# Patient Record
Sex: MALE | Race: BLACK OR AFRICAN AMERICAN
[De-identification: names, ages, dates, MRNs, and addresses within clinical notes are randomized per-mention and may not be internally consistent; named-entity substitution may affect disease eponyms.]

---

## 2018-08-10 ENCOUNTER — HOSPITAL ENCOUNTER (EMERGENCY)
Dept: HOSPITAL 47 - EC | Age: 53
Discharge: LEFT BEFORE DISCHARGE | End: 2018-08-10
Payer: COMMERCIAL

## 2018-08-10 VITALS
TEMPERATURE: 98.6 F | HEART RATE: 89 BPM | DIASTOLIC BLOOD PRESSURE: 89 MMHG | RESPIRATION RATE: 18 BRPM | SYSTOLIC BLOOD PRESSURE: 132 MMHG

## 2018-08-10 DIAGNOSIS — Z79.4: ICD-10-CM

## 2018-08-10 DIAGNOSIS — Z79.899: ICD-10-CM

## 2018-08-10 DIAGNOSIS — I25.10: ICD-10-CM

## 2018-08-10 DIAGNOSIS — Z87.891: ICD-10-CM

## 2018-08-10 DIAGNOSIS — Z95.818: ICD-10-CM

## 2018-08-10 DIAGNOSIS — H53.9: Primary | ICD-10-CM

## 2018-08-10 DIAGNOSIS — Z86.14: ICD-10-CM

## 2018-08-10 DIAGNOSIS — I50.9: ICD-10-CM

## 2018-08-10 DIAGNOSIS — E11.9: ICD-10-CM

## 2018-08-10 DIAGNOSIS — I11.0: ICD-10-CM

## 2018-08-10 LAB — GLUCOSE BLD-MCNC: 90 MG/DL (ref 75–99)

## 2018-08-10 PROCEDURE — 99284 EMERGENCY DEPT VISIT MOD MDM: CPT

## 2018-08-10 PROCEDURE — 36415 COLL VENOUS BLD VENIPUNCTURE: CPT

## 2018-08-10 NOTE — ED
Eye Problem HPI





- General


Chief complaint: Eye Problems


Stated complaint: vision trouble


Time Seen by Provider: 08/10/18 14:32


Source: patient, RN notes reviewed, old records reviewed


Mode of arrival: ambulatory


Limitations: no limitations





- History of Present Illness


Initial comments: 





Patient is a 32-year-old male with history of diabetes presents emergency 

Department with irritation and changes in vision his left eye.  He reports he's 

been having symptoms intermittently for the past 3 months.  Patient reports 

that he is concerned that his blood sugar was elevated and that's why his 

vision is having abnormal changes.  Patient states that he has had no metformin 

within the past few weeks as his ran out of his medication.  [facility]  [facility] within the next few weeks for his eyes.





- Related Data


 Home Medications











 Medication  Instructions  Recorded  Confirmed


 


Atorvastatin [Lipitor] 80 mg PO DAILY 16


 


Carvedilol [Coreg] 12.5 mg PO BID 16


 


INSULIN LISPRO (humaLOG) [HumaLOG] 16 units SQ ACHS 16








 Previous Rx's











 Medication  Instructions  Recorded


 


Aspirin EC [Ecotrin] 325 mg PO DAILY  tablet. 03/13/15


 


Atorvastatin [Lipitor] 40 mg PO HS #30 tab 16


 


Carvedilol [Coreg] 6.25 mg PO BID-W/MEALS #60 tab 16


 


Furosemide [Lasix] 40 mg PO DAILY #30 tab 16


 


Isosorbide Mononitrate ER [Imdur] 60 mg PO DAILY #30 tab.er.24h 16


 


Lisinopril [Zestril] 10 mg PO DAILY #30 tab 16


 


Nitroglycerin Sl Tabs [Nitrostat] 0.4 mg SUBLINGUAL Q5M PRN #30 tab 16


 


Spironolactone [Aldactone] 25 mg PO DAILY #30 tab 16











 Allergies











Allergy/AdvReac Type Severity Reaction Status Date / Time


 


No Known Allergies Allergy   Verified 08/10/18 14:29














Review of Systems


ROS Statement: 


Those systems with pertinent positive or pertinent negative responses have been 

documented in the HPI.





ROS Other: All systems not noted in ROS Statement are negative.





Past Medical History


Past Medical History: Coronary Artery Disease (CAD), Chest Pain / Angina, Heart 

Failure, Diabetes Mellitus, Hypertension


Additional Past Medical History / Comment(s): 12 Pt Presented by 

ambulance to [facility] ER with chest pain that felt like "Heart burn".  On - Pt was admitted with chest pain- had cardiac cath which showed mild disease 

in his LAD, occluded cx, Occluded proximal RCA.  Other HX:  sciatica, ADMITTED 

ON 3/10/15 WITH ELEVATED BLOOD GLUCOSE OF 1200, PATIENT NOT AWARE OF PREVIOUS 

DIABETES.


History of Any Multi-Drug Resistant Organisms: MRSA


Date of last positivie culture/infection: 


MDRO Source:: left index finger


Past Surgical History: Heart Catheterization


Additional Past Surgical History / Comment(s): - Pt admitted with 

chest pain and had a cardiac cath which showed mild disease in his LAD, 

occluded cx, occluded proximal RCA.  Stitches in right arm from deep laceration 

long ago.


Past Anesthesia/Blood Transfusion Reactions: No Reported Reaction


Additional Past Anesthesia/Blood Transfusion Reaction / Comment(s): Never had 

past transfusion


Past Psychological History: No Psychological Hx Reported, PTSD


Smoking Status: Former smoker


Past Alcohol Use History: None Reported


Past Drug Use History: None Reported





- Past Family History


  ** Father Brother(s)


Family Medical History: Coronary Artery Disease (CAD), Myocardial Infarction (MI

)


Additional Family Medical History / Comment(s): Pt's father and brothers have 

all  of MI's.





General Exam





- General Exam Comments


Initial Comments: 





52-year-old male.  Alert and oriented.  Patient is -American.  No acute 

distress.


Limitations: no limitations


General appearance: alert, in no apparent distress


Head exam: Present: atraumatic, normocephalic, normal inspection


Eye exam: Present: normal appearance, PERRL, EOMI.  Absent: scleral icterus, 

conjunctival injection, periorbital swelling


ENT exam: Present: normal exam, mucous membranes moist


Neck exam: Present: normal inspection


Respiratory exam: Present: normal lung sounds bilaterally.  Absent: respiratory 

distress, wheezes, rales, rhonchi, stridor


Cardiovascular Exam: Present: regular rate, normal rhythm, normal heart sounds.

  Absent: systolic murmur, diastolic murmur, rubs, gallop, clicks


Neurological exam: Present: alert, oriented X3


Psychiatric exam: Present: normal affect, normal mood


Skin exam: Present: warm, dry, intact, normal color.  Absent: rash





Course





 Vital Signs











  08/10/18





  14:26


 


Temperature 98.6 F


 


Pulse Rate 89


 


Respiratory 18





Rate 


 


Blood Pressure 132/89


 


O2 Sat by Pulse 100





Oximetry 














Medical Decision Making





- Medical Decision Making





32-year-old male presents emergency Department with complaints of visual 

changes with the left eye.  He reports that they come and go for the past few 

weeks.  He is mainly concerned for his blood sugar being elevated.  He states 

that he would like to have his blood sugar checked.  I did check it and it was 

90 at this time.  He shouldn't was a firmness with a normal blood result.  I 

discussed the could do further evaluation for his eye and further testing.  

Patient was quite adamant he would like to leave.  He stated that he had to go 

to another appointment.  Patient will be leaving, he did not sign out AMA 

papers.





- Lab Data





 Lab Results











  08/10/18 Range/Units





  15:04 


 


POC Glucose (mg/dL)  90  (75-99)  mg/dL


 


POC Glu Operater ID  Elizabeth Precaido  














Disposition


Clinical Impression: 


 Normal blood sugar, Visual changes





Disposition: Left Against Medical Advice


Condition: Stable


Is patient prescribed a controlled substance at d/c from ED?: No


Referrals: 


Nonstaff,Physician [Primary Care Provider] - 1-2 days


Time of Disposition: 15:20

## 2021-02-02 ENCOUNTER — HOSPITAL ENCOUNTER (INPATIENT)
Dept: HOSPITAL 47 - EC | Age: 56
LOS: 7 days | Discharge: HOME | DRG: 871 | End: 2021-02-09
Attending: INTERNAL MEDICINE | Admitting: INTERNAL MEDICINE
Payer: COMMERCIAL

## 2021-02-02 VITALS — BODY MASS INDEX: 35.4 KG/M2

## 2021-02-02 DIAGNOSIS — Z79.899: ICD-10-CM

## 2021-02-02 DIAGNOSIS — A40.3: Primary | ICD-10-CM

## 2021-02-02 DIAGNOSIS — I82.612: ICD-10-CM

## 2021-02-02 DIAGNOSIS — R04.2: ICD-10-CM

## 2021-02-02 DIAGNOSIS — J13: ICD-10-CM

## 2021-02-02 DIAGNOSIS — N17.9: ICD-10-CM

## 2021-02-02 DIAGNOSIS — F17.210: ICD-10-CM

## 2021-02-02 DIAGNOSIS — F43.10: ICD-10-CM

## 2021-02-02 DIAGNOSIS — I25.2: ICD-10-CM

## 2021-02-02 DIAGNOSIS — I50.22: ICD-10-CM

## 2021-02-02 DIAGNOSIS — I25.10: ICD-10-CM

## 2021-02-02 DIAGNOSIS — Z86.14: ICD-10-CM

## 2021-02-02 DIAGNOSIS — Z82.49: ICD-10-CM

## 2021-02-02 DIAGNOSIS — Z91.14: ICD-10-CM

## 2021-02-02 DIAGNOSIS — E78.5: ICD-10-CM

## 2021-02-02 DIAGNOSIS — Z20.822: ICD-10-CM

## 2021-02-02 DIAGNOSIS — G58.9: ICD-10-CM

## 2021-02-02 DIAGNOSIS — Z96.642: ICD-10-CM

## 2021-02-02 DIAGNOSIS — E11.10: ICD-10-CM

## 2021-02-02 DIAGNOSIS — Z79.82: ICD-10-CM

## 2021-02-02 DIAGNOSIS — H57.89: ICD-10-CM

## 2021-02-02 DIAGNOSIS — I21.4: ICD-10-CM

## 2021-02-02 DIAGNOSIS — E11.41: ICD-10-CM

## 2021-02-02 DIAGNOSIS — I25.5: ICD-10-CM

## 2021-02-02 DIAGNOSIS — J44.0: ICD-10-CM

## 2021-02-02 DIAGNOSIS — J98.11: ICD-10-CM

## 2021-02-02 DIAGNOSIS — I11.0: ICD-10-CM

## 2021-02-02 DIAGNOSIS — Z91.19: ICD-10-CM

## 2021-02-02 DIAGNOSIS — Z79.4: ICD-10-CM

## 2021-02-02 DIAGNOSIS — I47.1: ICD-10-CM

## 2021-02-02 LAB
ALBUMIN SERPL-MCNC: 4.4 G/DL (ref 3.5–5)
ALP SERPL-CCNC: 121 U/L (ref 38–126)
ALT SERPL-CCNC: 29 U/L (ref 4–49)
ANION GAP SERPL CALC-SCNC: 10 MMOL/L
ANION GAP SERPL CALC-SCNC: 16 MMOL/L
ANION GAP SERPL CALC-SCNC: 19 MMOL/L
APTT BLD: 19.6 SEC (ref 22–30)
AST SERPL-CCNC: 43 U/L (ref 17–59)
BASOPHILS # BLD AUTO: 0 K/UL (ref 0–0.2)
BASOPHILS NFR BLD AUTO: 0 %
BUN SERPL-SCNC: 30 MG/DL (ref 9–20)
BUN SERPL-SCNC: 30 MG/DL (ref 9–20)
BUN SERPL-SCNC: 32 MG/DL (ref 9–20)
CALCIUM SPEC-MCNC: 9.9 MG/DL (ref 8.4–10.2)
CHLORIDE SERPL-SCNC: 101 MMOL/L (ref 98–107)
CHLORIDE SERPL-SCNC: 105 MMOL/L (ref 98–107)
CHLORIDE SERPL-SCNC: 98 MMOL/L (ref 98–107)
CO2 SERPL-SCNC: 14 MMOL/L (ref 22–30)
CO2 SERPL-SCNC: 22 MMOL/L (ref 22–30)
CO2 SERPL-SCNC: 24 MMOL/L (ref 22–30)
D DIMER PPP FEU-MCNC: 1.06 MG/L FEU (ref ?–0.6)
EOSINOPHIL # BLD AUTO: 0 K/UL (ref 0–0.7)
EOSINOPHIL NFR BLD AUTO: 0 %
ERYTHROCYTE [DISTWIDTH] IN BLOOD BY AUTOMATED COUNT: 5.01 M/UL (ref 4.3–5.9)
ERYTHROCYTE [DISTWIDTH] IN BLOOD: 13.8 % (ref 11.5–15.5)
GLUCOSE BLD-MCNC: 107 MG/DL (ref 75–99)
GLUCOSE BLD-MCNC: 115 MG/DL (ref 75–99)
GLUCOSE BLD-MCNC: 126 MG/DL (ref 75–99)
GLUCOSE BLD-MCNC: 127 MG/DL (ref 75–99)
GLUCOSE BLD-MCNC: 171 MG/DL (ref 75–99)
GLUCOSE BLD-MCNC: 468 MG/DL (ref 75–99)
GLUCOSE BLD-MCNC: 470 MG/DL (ref 75–99)
GLUCOSE BLD-MCNC: 93 MG/DL (ref 75–99)
GLUCOSE SERPL-MCNC: 266 MG/DL (ref 74–99)
GLUCOSE SERPL-MCNC: 579 MG/DL (ref 74–99)
GLUCOSE SERPL-MCNC: 87 MG/DL (ref 74–99)
GLUCOSE UR QL: (no result)
HCT VFR BLD AUTO: 42.7 % (ref 39–53)
HGB BLD-MCNC: 14.4 GM/DL (ref 13–17.5)
INR PPP: 1 (ref ?–1.2)
KETONES UR QL STRIP.AUTO: (no result)
LYMPHOCYTES # SPEC AUTO: 2.8 K/UL (ref 1–4.8)
LYMPHOCYTES NFR SPEC AUTO: 16 %
MAGNESIUM SPEC-SCNC: 2.3 MG/DL (ref 1.6–2.3)
MCH RBC QN AUTO: 28.7 PG (ref 25–35)
MCHC RBC AUTO-ENTMCNC: 33.7 G/DL (ref 31–37)
MCV RBC AUTO: 85.1 FL (ref 80–100)
MONOCYTES # BLD AUTO: 0.8 K/UL (ref 0–1)
MONOCYTES NFR BLD AUTO: 5 %
NEUTROPHILS # BLD AUTO: 13.5 K/UL (ref 1.3–7.7)
NEUTROPHILS NFR BLD AUTO: 78 %
PH UR: 5 [PH] (ref 5–8)
PLATELET # BLD AUTO: 181 K/UL (ref 150–450)
POTASSIUM SERPL-SCNC: 3.9 MMOL/L (ref 3.5–5.1)
POTASSIUM SERPL-SCNC: 4 MMOL/L (ref 3.5–5.1)
POTASSIUM SERPL-SCNC: 4.9 MMOL/L (ref 3.5–5.1)
PROT SERPL-MCNC: 7.7 G/DL (ref 6.3–8.2)
PT BLD: 10.5 SEC (ref 9–12)
SODIUM SERPL-SCNC: 131 MMOL/L (ref 137–145)
SODIUM SERPL-SCNC: 139 MMOL/L (ref 137–145)
SODIUM SERPL-SCNC: 139 MMOL/L (ref 137–145)
SP GR UR: 1.03 (ref 1–1.03)
UROBILINOGEN UR QL STRIP: <2 MG/DL (ref ?–2)
WBC # BLD AUTO: 17.4 K/UL (ref 3.8–10.6)
WBC # UR AUTO: 1 /HPF (ref 0–5)

## 2021-02-02 PROCEDURE — 87040 BLOOD CULTURE FOR BACTERIA: CPT

## 2021-02-02 PROCEDURE — 83735 ASSAY OF MAGNESIUM: CPT

## 2021-02-02 PROCEDURE — 93005 ELECTROCARDIOGRAM TRACING: CPT

## 2021-02-02 PROCEDURE — 74177 CT ABD & PELVIS W/CONTRAST: CPT

## 2021-02-02 PROCEDURE — 93312 ECHO TRANSESOPHAGEAL: CPT

## 2021-02-02 PROCEDURE — 84100 ASSAY OF PHOSPHORUS: CPT

## 2021-02-02 PROCEDURE — 83880 ASSAY OF NATRIURETIC PEPTIDE: CPT

## 2021-02-02 PROCEDURE — 96368 THER/DIAG CONCURRENT INF: CPT

## 2021-02-02 PROCEDURE — 85730 THROMBOPLASTIN TIME PARTIAL: CPT

## 2021-02-02 PROCEDURE — 87205 SMEAR GRAM STAIN: CPT

## 2021-02-02 PROCEDURE — 85025 COMPLETE CBC W/AUTO DIFF WBC: CPT

## 2021-02-02 PROCEDURE — 86140 C-REACTIVE PROTEIN: CPT

## 2021-02-02 PROCEDURE — 93325 DOPPLER ECHO COLOR FLOW MAPG: CPT

## 2021-02-02 PROCEDURE — 84145 PROCALCITONIN (PCT): CPT

## 2021-02-02 PROCEDURE — 84484 ASSAY OF TROPONIN QUANT: CPT

## 2021-02-02 PROCEDURE — 85027 COMPLETE CBC AUTOMATED: CPT

## 2021-02-02 PROCEDURE — 71046 X-RAY EXAM CHEST 2 VIEWS: CPT

## 2021-02-02 PROCEDURE — 96365 THER/PROPH/DIAG IV INF INIT: CPT

## 2021-02-02 PROCEDURE — 80061 LIPID PANEL: CPT

## 2021-02-02 PROCEDURE — 82565 ASSAY OF CREATININE: CPT

## 2021-02-02 PROCEDURE — 36415 COLL VENOUS BLD VENIPUNCTURE: CPT

## 2021-02-02 PROCEDURE — 84520 ASSAY OF UREA NITROGEN: CPT

## 2021-02-02 PROCEDURE — 82947 ASSAY GLUCOSE BLOOD QUANT: CPT

## 2021-02-02 PROCEDURE — 96376 TX/PRO/DX INJ SAME DRUG ADON: CPT

## 2021-02-02 PROCEDURE — 83605 ASSAY OF LACTIC ACID: CPT

## 2021-02-02 PROCEDURE — 81001 URINALYSIS AUTO W/SCOPE: CPT

## 2021-02-02 PROCEDURE — 80053 COMPREHEN METABOLIC PANEL: CPT

## 2021-02-02 PROCEDURE — 87070 CULTURE OTHR SPECIMN AEROBIC: CPT

## 2021-02-02 PROCEDURE — 80048 BASIC METABOLIC PNL TOTAL CA: CPT

## 2021-02-02 PROCEDURE — 96361 HYDRATE IV INFUSION ADD-ON: CPT

## 2021-02-02 PROCEDURE — 87635 SARS-COV-2 COVID-19 AMP PRB: CPT

## 2021-02-02 PROCEDURE — 85379 FIBRIN DEGRADATION QUANT: CPT

## 2021-02-02 PROCEDURE — 85049 AUTOMATED PLATELET COUNT: CPT

## 2021-02-02 PROCEDURE — 93306 TTE W/DOPPLER COMPLETE: CPT

## 2021-02-02 PROCEDURE — 85610 PROTHROMBIN TIME: CPT

## 2021-02-02 PROCEDURE — 71275 CT ANGIOGRAPHY CHEST: CPT

## 2021-02-02 PROCEDURE — 71045 X-RAY EXAM CHEST 1 VIEW: CPT

## 2021-02-02 PROCEDURE — 99291 CRITICAL CARE FIRST HOUR: CPT

## 2021-02-02 PROCEDURE — 80051 ELECTROLYTE PANEL: CPT

## 2021-02-02 PROCEDURE — 83036 HEMOGLOBIN GLYCOSYLATED A1C: CPT

## 2021-02-02 PROCEDURE — 93458 L HRT ARTERY/VENTRICLE ANGIO: CPT

## 2021-02-02 RX ADMIN — INSULIN ASPART SCH UNIT: 100 INJECTION, SOLUTION INTRAVENOUS; SUBCUTANEOUS at 11:23

## 2021-02-02 RX ADMIN — PANTOPRAZOLE SODIUM SCH MG: 40 INJECTION, POWDER, FOR SOLUTION INTRAVENOUS at 17:27

## 2021-02-02 RX ADMIN — NITROGLYCERIN SCH: 20 OINTMENT TOPICAL at 23:25

## 2021-02-02 RX ADMIN — CEFAZOLIN SCH MLS/HR: 330 INJECTION, POWDER, FOR SOLUTION INTRAMUSCULAR; INTRAVENOUS at 14:56

## 2021-02-02 RX ADMIN — NITROGLYCERIN SCH INCH: 20 OINTMENT TOPICAL at 23:23

## 2021-02-02 RX ADMIN — NITROGLYCERIN SCH: 20 OINTMENT TOPICAL at 16:23

## 2021-02-02 RX ADMIN — NITROGLYCERIN SCH: 20 OINTMENT TOPICAL at 17:27

## 2021-02-02 RX ADMIN — DEXTROSE MONOHYDRATE, SODIUM CHLORIDE, AND POTASSIUM CHLORIDE SCH MLS/HR: 50; 4.5; 1.49 INJECTION, SOLUTION INTRAVENOUS at 17:27

## 2021-02-02 RX ADMIN — DEXTROSE MONOHYDRATE, SODIUM CHLORIDE, AND POTASSIUM CHLORIDE SCH: 50; 4.5; 1.49 INJECTION, SOLUTION INTRAVENOUS at 21:18

## 2021-02-02 RX ADMIN — HEPARIN SODIUM SCH MLS/HR: 10000 INJECTION, SOLUTION INTRAVENOUS at 12:29

## 2021-02-02 RX ADMIN — INSULIN ASPART SCH: 100 INJECTION, SOLUTION INTRAVENOUS; SUBCUTANEOUS at 21:18

## 2021-02-02 RX ADMIN — INSULIN ASPART SCH UNIT: 100 INJECTION, SOLUTION INTRAVENOUS; SUBCUTANEOUS at 13:13

## 2021-02-02 RX ADMIN — CEFAZOLIN SCH: 330 INJECTION, POWDER, FOR SOLUTION INTRAMUSCULAR; INTRAVENOUS at 22:27

## 2021-02-02 RX ADMIN — CEFAZOLIN SCH MLS/HR: 330 INJECTION, POWDER, FOR SOLUTION INTRAMUSCULAR; INTRAVENOUS at 21:08

## 2021-02-02 NOTE — ED
General Adult HPI





- General


Chief complaint: Chest Pain


Stated complaint: Chest pain


Time Seen by Provider: 21 08:58


Source: patient, EMS, RN notes reviewed


Mode of arrival: EMS


Limitations: no limitations





- History of Present Illness


Initial comments: 





patient is a pleasant 55-year-old male presenting to the emergency Department 

with chest discomfort.  Patient had mild symptoms yesterday, worse this morning.

 Discomfort is now mild again at this time.  Discomfort is difficult to 

describe.  No radiation.  Patient does feel a little short of breath.  Patient 

did have cough starting yesterday that was somewhat severe.  Patient did have an

episode of hemoptysis.  Patient states chest discomfort does increase with deep 

breaths.  No nausea or diaphoresis.  Patient questions whether or not there 

could've been a fever.  No leg pain or leg swelling.





- Related Data


                                Home Medications











 Medication  Instructions  Recorded  Confirmed


 


Atorvastatin [Lipitor] 40 mg PO DAILY 21


 


Isosorbide Mononitrate ER [Imdur] 90 mg PO DAILY 21


 


amLODIPine [Norvasc] 2.5 mg PO DAILY 21


 


metFORMIN HCL [Glucophage] 500 mg PO BID 21








                                  Previous Rx's











 Medication  Instructions  Recorded


 


Furosemide [Lasix] 40 mg PO DAILY #30 tab 16


 


Nitroglycerin Sl Tabs [Nitrostat] 0.4 mg SUBLINGUAL Q5M PRN #30 tab 16


 


lisinopriL [Zestril] 10 mg PO DAILY #30 tab 16











                                    Allergies











Allergy/AdvReac Type Severity Reaction Status Date / Time


 


No Known Allergies Allergy   Verified 21 10:29














Review of Systems


ROS Statement: 


Those systems with pertinent positive or pertinent negative responses have been 

documented in the HPI.





ROS Other: All systems not noted in ROS Statement are negative.


Constitutional: Reports: as per HPI


Eyes: Denies: eye pain


ENT: Denies: ear pain


Respiratory: Reports: cough, dyspnea


Cardiovascular: Reports: chest pain


Endocrine: Denies: fatigue


Gastrointestinal: Denies: abdominal pain


Genitourinary: Reports: frequency.  Denies: urgency


Musculoskeletal: Denies: back pain


Skin: Denies: rash


Neurological: Denies: weakness





Past Medical History


Past Medical History: Coronary Artery Disease (CAD), Chest Pain / Angina, Heart 

Failure, Diabetes Mellitus, Hypertension


Additional Past Medical History / Comment(s): 12 Pt Presented by ambulance

 to [facility] ER with chest pain that felt like "Heart burn".  On - Pt was 

admitted with chest pain- had cardiac cath which showed mild disease in his LAD,

 occluded cx, Occluded proximal RCA.  Other HX:  sciatica, ADMITTED ON 3/10/15 

WITH ELEVATED BLOOD GLUCOSE OF 1200, PATIENT NOT AWARE OF PREVIOUS DIABETES.


History of Any Multi-Drug Resistant Organisms: MRSA


Date of last positivie culture/infection: 


MDRO Source:: left index finger


Past Surgical History: Heart Catheterization


Additional Past Surgical History / Comment(s): - Pt admitted with 

chest pain and had a cardiac cath which showed mild disease in his LAD, occluded

 cx, occluded proximal RCA.  Stitches in right arm from deep laceration long 

ago.


Past Anesthesia/Blood Transfusion Reactions: No Reported Reaction


Additional Past Anesthesia/Blood Transfusion Reaction / Comment(s): Never had 

past transfusion


Past Psychological History: No Psychological Hx Reported, PTSD


Smoking Status: Current some day smoker


Past Alcohol Use History: None Reported


Past Drug Use History: Marijuana





- Past Family History


  ** Father Brother(s)


Family Medical History: Coronary Artery Disease (CAD), Myocardial Infarction 

(MI)


Additional Family Medical History / Comment(s): Pt's father and brothers have 

all  of MI's.





General Exam


Limitations: no limitations


General appearance: alert, in no apparent distress


Head exam: Present: normocephalic


Eye exam: Present: normal appearance


ENT exam: Present: other (Poor dentition)


Neck exam: Present: normal inspection


Respiratory exam: Present: normal lung sounds bilaterally, chest wall tenderness

 (Mild tenderness upper left anterior chest wall)


Cardiovascular Exam: Present: regular rate, normal rhythm


  ** Expanded


Peripheral pulses: 2+: Radial (R), Radial (L), Posterior Tibialis (R), Posterior

 Tibialis (L)


GI/Abdominal exam: Present: soft.  Absent: tenderness


Extremities exam: Present: normal inspection.  Absent: pedal edema, calf 

tenderness


Neurological exam: Present: alert


Psychiatric exam: Present: normal affect, normal mood


Skin exam: Present: normal color





Course


                                   Vital Signs











  21





  08:54 09:01 09:12


 


Temperature 99.1 F  


 


Pulse Rate 98  96


 


Pulse Rate [  97 





Cardiac Monitor   





]   


 


Respiratory 16 15 16





Rate   


 


Blood Pressure 136/87  136/87


 


O2 Sat by Pulse 97  100





Oximetry   














  21





  09:30 11:00


 


Temperature  98.6 F


 


Pulse Rate 96 91


 


Pulse Rate [  





Cardiac Monitor  





]  


 


Respiratory 14 16





Rate  


 


Blood Pressure 136/87 144/95


 


O2 Sat by Pulse 97 100





Oximetry  














- Reevaluation(s)


Reevaluation #1: 





21 11:23


There is some potential for infection and sepsis diagnosed at 11:20 AM.  Blood 

culture and lactic acid and IV antibiotics will be ordered





EKG Findings





- EKG Comments:


EKG Findings:: Normal sinus rhythm and 96.  .  .  .  .

 Left axis.  LVH with repolarization change.





Medical Decision Making





- Medical Decision Making





Patient reevaluated and updated.  Patient will be heparinized for elevated 

troponin.  Corona test will be added.  Cardiology and pulmonary will be placed 

on consult.





- Lab Data


Result diagrams: 


                                 21 09:10





                                 21 09:10


                                   Lab Results











  21 Range/Units





  09:10 09:10 09:10 


 


WBC  17.4 H    (3.8-10.6)  k/uL


 


RBC  5.01    (4.30-5.90)  m/uL


 


Hgb  14.4    (13.0-17.5)  gm/dL


 


Hct  42.7    (39.0-53.0)  %


 


MCV  85.1    (80.0-100.0)  fL


 


MCH  28.7    (25.0-35.0)  pg


 


MCHC  33.7    (31.0-37.0)  g/dL


 


RDW  13.8    (11.5-15.5)  %


 


Plt Count  181    (150-450)  k/uL


 


MPV  9.2    


 


Neutrophils %  78    %


 


Lymphocytes %  16    %


 


Monocytes %  5    %


 


Eosinophils %  0    %


 


Basophils %  0    %


 


Neutrophils #  13.5 H    (1.3-7.7)  k/uL


 


Lymphocytes #  2.8    (1.0-4.8)  k/uL


 


Monocytes #  0.8    (0-1.0)  k/uL


 


Eosinophils #  0.0    (0-0.7)  k/uL


 


Basophils #  0.0    (0-0.2)  k/uL


 


PT   10.5   (9.0-12.0)  sec


 


INR   1.0   (<1.2)  


 


APTT   19.6 L   (22.0-30.0)  sec


 


D-Dimer   1.06 H   (<0.60)  mg/L FEU


 


Sodium    131 L  (137-145)  mmol/L


 


Potassium    4.9  (3.5-5.1)  mmol/L


 


Chloride    98  ()  mmol/L


 


Carbon Dioxide    14 L  (22-30)  mmol/L


 


Anion Gap    19  mmol/L


 


BUN    32 H  (9-20)  mg/dL


 


Creatinine    1.62 H  (0.66-1.25)  mg/dL


 


Est GFR (CKD-EPI)AfAm    54  (>60 ml/min/1.73 sqM)  


 


Est GFR (CKD-EPI)NonAf    46  (>60 ml/min/1.73 sqM)  


 


Glucose    579 H*  (74-99)  mg/dL


 


POC Glucose (mg/dL)     (75-99)  mg/dL


 


POC Glu Operater ID     


 


Calcium    9.9  (8.4-10.2)  mg/dL


 


Magnesium    2.3  (1.6-2.3)  mg/dL


 


Total Bilirubin    1.0  (0.2-1.3)  mg/dL


 


AST    43  (17-59)  U/L


 


ALT    29  (4-49)  U/L


 


Alkaline Phosphatase    121  ()  U/L


 


Troponin I     (0.000-0.034)  ng/mL


 


NT-Pro-B Natriuret Pep     pg/mL


 


Total Protein    7.7  (6.3-8.2)  g/dL


 


Albumin    4.4  (3.5-5.0)  g/dL


 


Urine Color     


 


Urine Appearance     (Clear)  


 


Urine pH     (5.0-8.0)  


 


Ur Specific Gravity     (1.001-1.035)  


 


Urine Protein     (Negative)  


 


Urine Glucose (UA)     (Negative)  


 


Urine Ketones     (Negative)  


 


Urine Blood     (Negative)  


 


Urine Nitrite     (Negative)  


 


Urine Bilirubin     (Negative)  


 


Urine Urobilinogen     (<2.0)  mg/dL


 


Ur Leukocyte Esterase     (Negative)  


 


Urine WBC     (0-5)  /hpf














  21 Range/Units





  09:10 09:10 09:10 


 


WBC     (3.8-10.6)  k/uL


 


RBC     (4.30-5.90)  m/uL


 


Hgb     (13.0-17.5)  gm/dL


 


Hct     (39.0-53.0)  %


 


MCV     (80.0-100.0)  fL


 


MCH     (25.0-35.0)  pg


 


MCHC     (31.0-37.0)  g/dL


 


RDW     (11.5-15.5)  %


 


Plt Count     (150-450)  k/uL


 


MPV     


 


Neutrophils %     %


 


Lymphocytes %     %


 


Monocytes %     %


 


Eosinophils %     %


 


Basophils %     %


 


Neutrophils #     (1.3-7.7)  k/uL


 


Lymphocytes #     (1.0-4.8)  k/uL


 


Monocytes #     (0-1.0)  k/uL


 


Eosinophils #     (0-0.7)  k/uL


 


Basophils #     (0-0.2)  k/uL


 


PT     (9.0-12.0)  sec


 


INR     (<1.2)  


 


APTT     (22.0-30.0)  sec


 


D-Dimer     (<0.60)  mg/L FEU


 


Sodium     (137-145)  mmol/L


 


Potassium     (3.5-5.1)  mmol/L


 


Chloride     ()  mmol/L


 


Carbon Dioxide     (22-30)  mmol/L


 


Anion Gap     mmol/L


 


BUN     (9-20)  mg/dL


 


Creatinine     (0.66-1.25)  mg/dL


 


Est GFR (CKD-EPI)AfAm     (>60 ml/min/1.73 sqM)  


 


Est GFR (CKD-EPI)NonAf     (>60 ml/min/1.73 sqM)  


 


Glucose     (74-99)  mg/dL


 


POC Glucose (mg/dL)     (75-99)  mg/dL


 


POC Glu Operater ID     


 


Calcium     (8.4-10.2)  mg/dL


 


Magnesium     (1.6-2.3)  mg/dL


 


Total Bilirubin     (0.2-1.3)  mg/dL


 


AST     (17-59)  U/L


 


ALT     (4-49)  U/L


 


Alkaline Phosphatase     ()  U/L


 


Troponin I  0.671 H*    (0.000-0.034)  ng/mL


 


NT-Pro-B Natriuret Pep   5060   pg/mL


 


Total Protein     (6.3-8.2)  g/dL


 


Albumin     (3.5-5.0)  g/dL


 


Urine Color    Light Yellow  


 


Urine Appearance    Clear  (Clear)  


 


Urine pH    5.0  (5.0-8.0)  


 


Ur Specific Gravity    1.032  (1.001-1.035)  


 


Urine Protein    Trace H  (Negative)  


 


Urine Glucose (UA)    4+ H  (Negative)  


 


Urine Ketones    2+ H  (Negative)  


 


Urine Blood    Small H  (Negative)  


 


Urine Nitrite    Negative  (Negative)  


 


Urine Bilirubin    Negative  (Negative)  


 


Urine Urobilinogen    <2.0  (<2.0)  mg/dL


 


Ur Leukocyte Esterase    Negative  (Negative)  


 


Urine WBC    1  (0-5)  /hpf














  21 Range/Units





  11:14 


 


WBC   (3.8-10.6)  k/uL


 


RBC   (4.30-5.90)  m/uL


 


Hgb   (13.0-17.5)  gm/dL


 


Hct   (39.0-53.0)  %


 


MCV   (80.0-100.0)  fL


 


MCH   (25.0-35.0)  pg


 


MCHC   (31.0-37.0)  g/dL


 


RDW   (11.5-15.5)  %


 


Plt Count   (150-450)  k/uL


 


MPV   


 


Neutrophils %   %


 


Lymphocytes %   %


 


Monocytes %   %


 


Eosinophils %   %


 


Basophils %   %


 


Neutrophils #   (1.3-7.7)  k/uL


 


Lymphocytes #   (1.0-4.8)  k/uL


 


Monocytes #   (0-1.0)  k/uL


 


Eosinophils #   (0-0.7)  k/uL


 


Basophils #   (0-0.2)  k/uL


 


PT   (9.0-12.0)  sec


 


INR   (<1.2)  


 


APTT   (22.0-30.0)  sec


 


D-Dimer   (<0.60)  mg/L FEU


 


Sodium   (137-145)  mmol/L


 


Potassium   (3.5-5.1)  mmol/L


 


Chloride   ()  mmol/L


 


Carbon Dioxide   (22-30)  mmol/L


 


Anion Gap   mmol/L


 


BUN   (9-20)  mg/dL


 


Creatinine   (0.66-1.25)  mg/dL


 


Est GFR (CKD-EPI)AfAm   (>60 ml/min/1.73 sqM)  


 


Est GFR (CKD-EPI)NonAf   (>60 ml/min/1.73 sqM)  


 


Glucose   (74-99)  mg/dL


 


POC Glucose (mg/dL)  468 H  (75-99)  mg/dL


 


POC Glu Operater ID  Lao, Blanca  


 


Calcium   (8.4-10.2)  mg/dL


 


Magnesium   (1.6-2.3)  mg/dL


 


Total Bilirubin   (0.2-1.3)  mg/dL


 


AST   (17-59)  U/L


 


ALT   (4-49)  U/L


 


Alkaline Phosphatase   ()  U/L


 


Troponin I   (0.000-0.034)  ng/mL


 


NT-Pro-B Natriuret Pep   pg/mL


 


Total Protein   (6.3-8.2)  g/dL


 


Albumin   (3.5-5.0)  g/dL


 


Urine Color   


 


Urine Appearance   (Clear)  


 


Urine pH   (5.0-8.0)  


 


Ur Specific Gravity   (1.001-1.035)  


 


Urine Protein   (Negative)  


 


Urine Glucose (UA)   (Negative)  


 


Urine Ketones   (Negative)  


 


Urine Blood   (Negative)  


 


Urine Nitrite   (Negative)  


 


Urine Bilirubin   (Negative)  


 


Urine Urobilinogen   (<2.0)  mg/dL


 


Ur Leukocyte Esterase   (Negative)  


 


Urine WBC   (0-5)  /hpf














- Radiology Data


Radiology results: report reviewed (CT angios chest shows nonspecific 

groundglass opacities, consider atypical pneumonia.  No pulmonary embolism.), 

image reviewed (Chest x-ray shows some pulmonary venous congestion without overt

failure.  No evidence for infiltrate.)





Critical Care Time


Critical Care Time: Yes


Total Critical Care Time: 32





Disposition


Clinical Impression: 


 Dyspnea, Elevated troponin, Hyperglycemia





Disposition: ADMITTED AS IP TO THIS \[facility]\""


Condition: Serious


Is patient prescribed a controlled substance at d/c from ED?: No


Referrals: 


Nonstaff,Physician [Primary Care Provider] - 1-2 days


Decision Time: 11:24

## 2021-02-02 NOTE — P.HPIM
History of Present Illness


 a pleasant 55-year-old gentleman came in with complains of a not feeling 

well and tiredness was comparing of chest pain sharp in nature retrosternal area

not associated with food patient has some pleuritic competent of the chest pain.

 Patient was bit short of breath as well.  Patient had a CT angios the chest x-

ray pulmonary embolism which showed some groundglass T's although patient is 

negative for cold and 19.  Patient does have leukocytosis no fever.  Patient 

also found to have elevated creatinine of 1.6 to along with the sodium of 131 

was also found to be in diabetic ketoacidosis with highly elevated blood sugars 

patient agrees that he is not competent with his diet recommendations are not 

complaint with his medications.  Patient has a mildly elevated troponin of 0.67 

and 0.89.  Patient does have history of ischemic cardiomyopathy with EF of 

around 20-25% which are actually improved to 35-40%.  Patient does have elevated

BNP.








Review of Systems








REVIEW OF SYSTEMS: 


CONSTITUTIONAL: No fever, no malaise, no fatigue. 


HEENT: No recent visual problems or hearing problems. Denied any sore throat. 


CARDIOVASCULAR: No  orthopnea, PND, no palpitations, no syncope. 


PULMONARYno hemoptysis. 


GASTROINTESTINAL: No diarrhea, no nausea, no vomiting, no abdominal pain. 


NEUROLOGICAL: No headaches, no weakness, no numbness. 


HEMATOLOGICAL: Denies any bleeding or petechiae. 


GENITOURINARY: Denies any burning micturition, frequency, or urgency. 


MUSCULOSKELETAL/RHEUMATOLOGICAL: Denies any joint pain, swelling, or any muscle 

pain. 


ENDOCRINE: Denies any polyuria or polydipsia. 





The rest of the 14-point review of systems is negative.











Past Medical History


Past Medical History: Coronary Artery Disease (CAD), Chest Pain / Angina, Heart 

Failure, Diabetes Mellitus, Hyperlipidemia, Hypertension, Myocardial Infarction 

(MI)


Additional Past Medical History / Comment(s): Ischemic cardiomyopathy, chronic 

angina, NIDDM type II, L hand numbness thought d/t pinched nerve, L eye vision 

changes.


Last Myocardial Infarction Date:: 


History of Any Multi-Drug Resistant Organisms: MRSA


Date of last positivie culture/infection: 


MDRO Source:: left index finger


Past Surgical History: Heart Catheterization, Joint Replacement


Additional Past Surgical History / Comment(s): Cardiac caths, total L hip 

arthroplasty.


Past Anesthesia/Blood Transfusion Reactions: No Reported Reaction


Additional Past Anesthesia/Blood Transfusion Reaction / Comment(s): Never had 

past transfusion


Past Psychological History: PTSD


Additional Psychological History / Comment(s): [facility] with other [facility].  Pt uses no assistive device.  He does not 

drive, [facility] rides [facility].  He was a marine.


Smoking Status: Light tobacco smoker


Past Alcohol Use History: None Reported


Additional Past Alcohol Use History / Comment(s): Pt started smokig in  and 

quit in  but states he will take a couple puffs off a cigarette a day.


Past Drug Use History: Marijuana


Additional Drug Use History / Comment(s): Occasional marijuana





- Past Family History


  ** Father Brother(s)


Family Medical History: Coronary Artery Disease (CAD), Myocardial Infarction 

(MI)


Additional Family Medical History / Comment(s): Father  of a MI at the age 

of 53 yrs.





  ** Brother(s)


Family Medical History: Myocardial Infarction (MI)


Additional Family Medical History / Comment(s): Pt's younger brother  of a 

MI at the age of 41 yrs.  He does not have contact with his older brother.





  ** Mother


History Unknown: Yes


Additional Family Medical History / Comment(s): Mother is , pt cannot 

recall her medical hx





Medications and Allergies


                                Home Medications











 Medication  Instructions  Recorded  Confirmed  Type


 


Furosemide [Lasix] 40 mg PO DAILY #30 tab 16 Rx


 


Nitroglycerin Sl Tabs [Nitrostat] 0.4 mg SUBLINGUAL Q5M PRN #30 tab 16 Rx


 


lisinopriL [Zestril] 10 mg PO DAILY #30 tab 16 Rx


 


Atorvastatin [Lipitor] 40 mg PO DAILY 21 History


 


Isosorbide Mononitrate ER [Imdur] 90 mg PO DAILY 21 History


 


amLODIPine [Norvasc] 2.5 mg PO DAILY 21 History


 


metFORMIN HCL [Glucophage] 500 mg PO BID 21 History








                                    Allergies











Allergy/AdvReac Type Severity Reaction Status Date / Time


 


No Known Allergies Allergy   Verified 21 10:29














Physical Exam


Vitals: 


                                   Vital Signs











  Temp Pulse Pulse Resp BP Pulse Ox


 


 21 13:17  98.5 F  92   16  141/87  96


 


 21 11:52   92   18  134/91  96


 


 21 11:00  98.6 F  91   16  144/95  100


 


 21 09:30   96   14  136/87  97


 


 21 09:12   96   16  136/87  100


 


 21 09:01    97  15  


 


 21 08:54  99.1 F  98   16  136/87  97








                                Intake and Output











 21





 22:59 06:59 14:59


 


Other:   


 


  Weight   104.326 kg

















PHYSICAL EXAMINATION: 





GENERAL: The patient is alert and oriented x3, not in any acute distress. Well 

developed, well nourished. 


HEENT: Pupils are round and equally reacting to light. EOMI. No scleral icterus.

 No conjunctival pallor. Normocephalic, atraumatic. No pharyngeal erythema. No 

thyromegaly. 


CARDIOVASCULAR: S1 and S2 present. No murmurs, rubs, or gallops. 


PULMONARY: Chest is clear to auscultation, no wheezing or crackles. 


ABDOMEN: Soft, nontender, nondistended, normoactive bowel sounds. No palpable 

organomegaly. 


MUSCULOSKELETAL: No joint swelling or deformity.


EXTREMITIES: No cyanosis, clubbing, or pedal edema. 


NEUROLOGICAL: Gross neurological examination did not reveal any focal deficits. 


SKIN: No rashes. 

















Results


CBC & Chem 7: 


                                 21 09:10





                                 21 09:10


Labs: 


                  Abnormal Lab Results - Last 24 Hours (Table)











  21 Range/Units





  09:10 09:10 09:10 


 


WBC  17.4 H    (3.8-10.6)  k/uL


 


Neutrophils #  13.5 H    (1.3-7.7)  k/uL


 


APTT   19.6 L   (22.0-30.0)  sec


 


D-Dimer   1.06 H   (<0.60)  mg/L FEU


 


Sodium    131 L  (137-145)  mmol/L


 


Carbon Dioxide    14 L  (22-30)  mmol/L


 


BUN    32 H  (9-20)  mg/dL


 


Creatinine    1.62 H  (0.66-1.25)  mg/dL


 


Glucose    579 H*  (74-99)  mg/dL


 


POC Glucose (mg/dL)     (75-99)  mg/dL


 


Troponin I     (0.000-0.034)  ng/mL


 


Urine Protein     (Negative)  


 


Urine Glucose (UA)     (Negative)  


 


Urine Ketones     (Negative)  


 


Urine Blood     (Negative)  














  21 Range/Units





  09:10 09:10 11:14 


 


WBC     (3.8-10.6)  k/uL


 


Neutrophils #     (1.3-7.7)  k/uL


 


APTT     (22.0-30.0)  sec


 


D-Dimer     (<0.60)  mg/L FEU


 


Sodium     (137-145)  mmol/L


 


Carbon Dioxide     (22-30)  mmol/L


 


BUN     (9-20)  mg/dL


 


Creatinine     (0.66-1.25)  mg/dL


 


Glucose     (74-99)  mg/dL


 


POC Glucose (mg/dL)    468 H  (75-99)  mg/dL


 


Troponin I  0.671 H*    (0.000-0.034)  ng/mL


 


Urine Protein   Trace H   (Negative)  


 


Urine Glucose (UA)   4+ H   (Negative)  


 


Urine Ketones   2+ H   (Negative)  


 


Urine Blood   Small H   (Negative)  














  21 Range/Units





  12:19 12:57 


 


WBC    (3.8-10.6)  k/uL


 


Neutrophils #    (1.3-7.7)  k/uL


 


APTT    (22.0-30.0)  sec


 


D-Dimer    (<0.60)  mg/L FEU


 


Sodium    (137-145)  mmol/L


 


Carbon Dioxide    (22-30)  mmol/L


 


BUN    (9-20)  mg/dL


 


Creatinine    (0.66-1.25)  mg/dL


 


Glucose    (74-99)  mg/dL


 


POC Glucose (mg/dL)   470 H  (75-99)  mg/dL


 


Troponin I  0.892 H*   (0.000-0.034)  ng/mL


 


Urine Protein    (Negative)  


 


Urine Glucose (UA)    (Negative)  


 


Urine Ketones    (Negative)  


 


Urine Blood    (Negative)  














Thrombosis Risk Factor Assmnt





- Choose All That Apply


Each Factor Represents 1 point: Age 41-60 years, Obesity (BMI >25)


Other Risk Factors: No


Other congenital or acquired thrombophilia - If yes, enter type in comment: No


Thrombosis Risk Factor Assessment Total Risk Factor Score: 2


Thrombosis Risk Factor Assessment Level: Low Risk





Assessment and Plan


Plan: 


-Diabetic ketoacidosis patient has type 2 diabetes mellitus probably insulin-

dependent patient will be started on a diabetic ketoacidosis protocol patient is

 a going to receive lots of fluids but does have history of heart failure once 

the acidosis resolves which I believe will happen prednisone patient will be 

transitioned to long-acting and pre-meal insulin regimen look lites will be 

corrected patient will be switched to 50 mL of normal saline once the DKA resolv

es.


-Chest pain, ruled out for pulmonary embolism.  Possibility of non-ST elevation 

microinfarction cannot be ruled out patient has mildly elevated troponins.  

Patient was started on IV heparin echo is being obtained a stent


- history of cardiomyopathy previous to be ischemic cardiomyopathy severe s

ystolic dysfunction doesn't appear to be in acute exacerbation but the patient 

will receive fluids, we'll closely monitor for any heart failure exacerbation.


-Possibility of atypical pneumonia probably we need to repeat Covid 19 test 

morning and patient doesn't have any fevers patient will be continued on 

azithromycin and Rocephin will be discontinued.


-Coronary artery disease


-Type 2 diabetes mellitus uncontrolled elevated blood sugars secondary to 

noncompliance


-Hyperlipidemia


-Hypertension: Patient will be started on amlodipine and lisinopril will be held

 temporarily


-Acute renal failure probably secondary to intravascular depletion from DKA hold

 off on a lisinopril and Lasix at this time.


-DVT prophylaxis with the subclavian 7 GI prophylaxis with Protonix

## 2021-02-02 NOTE — CT
CT CHEST FOR PULMONARY EMBOLISM.

 

EXAMINATION TYPE: CT angio chest

 

DATE OF EXAM: 2/2/2021

 

INDICATION: Dyspnea, elevated d-dimer

 

CT DLP: 429.6 mGycm, Automated exposure control for dose reduction was used.

 

CONTRAST: Patient injected with 65 mL of Isovue 370.

 

COMPARISON: None

 

TECHNIQUE: CT of the chest is performed on a spiral scan at 2 mm thick sections.  Study is performed 
with intravenous contrast timed for evaluation for pulmonary embolism.  This will limit additional po
rtions of the evaluation.  3-D MIP images reconstructed by the technologist are reviewed on the compu
ter in the coronal and sagittal planes. 

 

FINDINGS:

No persistent filling defects are evident to suggest an acute pulmonary embolism.

 

No mediastinal or hilar adenopathy enlarged by CT criteria is evident.  The ascending aorta diameter 
at the level of the main pulmonary artery is 3.8 cm.  The main pulmonary artery diameter at the bifur
cation is 3.7 cm.

 

There are scattered groundglass opacities. Consider atypical pneumonia

 

Limited CT section through the upper abdomen are unremarkable.

 

IMPRESSIONS:

1. There are scattered nonspecific groundglass opacities bilaterally. Consider atypical pneumonia wit
hin the differential.

2. No acute pulmonary embolism.

## 2021-02-02 NOTE — XR
EXAMINATION TYPE: XR chest 2V

 

DATE OF EXAM: 2/2/2021

 

COMPARISON: 6/20/2016

 

HISTORY: Shortness of breath

 

TECHNIQUE:  Frontal and lateral views of the chest are obtained.

 

FINDINGS:

 

Scattered senescent parenchymal changes noted. There is pulmonary venous congestion without overt lee
lure. No evidence for infiltrate. No evidence for atelectasis.

 

Heart size is stable.

 

Mediastinal structures are stable and grossly unremarkable.

 

No evidence for hilar prominence.

 

Degenerative changes dorsal spine. 

 

IMPRESSION:

1. There is pulmonary venous congestion without overt failure. No evidence for infiltrate.

## 2021-02-03 LAB
ANION GAP SERPL CALC-SCNC: 9 MMOL/L
BASOPHILS # BLD AUTO: 0.1 K/UL (ref 0–0.2)
BASOPHILS NFR BLD AUTO: 0 %
BUN SERPL-SCNC: 27 MG/DL (ref 9–20)
CALCIUM SPEC-MCNC: 9.2 MG/DL (ref 8.4–10.2)
CHLORIDE SERPL-SCNC: 104 MMOL/L (ref 98–107)
CHOLEST SERPL-MCNC: 252 MG/DL (ref ?–200)
CO2 SERPL-SCNC: 23 MMOL/L (ref 22–30)
EOSINOPHIL # BLD AUTO: 0.2 K/UL (ref 0–0.7)
EOSINOPHIL NFR BLD AUTO: 1 %
ERYTHROCYTE [DISTWIDTH] IN BLOOD BY AUTOMATED COUNT: 4.67 M/UL (ref 4.3–5.9)
ERYTHROCYTE [DISTWIDTH] IN BLOOD: 14.9 % (ref 11.5–15.5)
GLUCOSE BLD-MCNC: 255 MG/DL (ref 75–99)
GLUCOSE BLD-MCNC: 322 MG/DL (ref 75–99)
GLUCOSE BLD-MCNC: 326 MG/DL (ref 75–99)
GLUCOSE BLD-MCNC: 353 MG/DL (ref 75–99)
GLUCOSE BLD-MCNC: 378 MG/DL (ref 75–99)
GLUCOSE SERPL-MCNC: 313 MG/DL (ref 74–99)
HCT VFR BLD AUTO: 40.9 % (ref 39–53)
HDLC SERPL-MCNC: 62 MG/DL (ref 40–60)
HGB BLD-MCNC: 13 GM/DL (ref 13–17.5)
LDLC SERPL CALC-MCNC: 160 MG/DL (ref 0–99)
LYMPHOCYTES # SPEC AUTO: 4 K/UL (ref 1–4.8)
LYMPHOCYTES NFR SPEC AUTO: 28 %
MCH RBC QN AUTO: 28 PG (ref 25–35)
MCHC RBC AUTO-ENTMCNC: 31.9 G/DL (ref 31–37)
MCV RBC AUTO: 87.6 FL (ref 80–100)
MONOCYTES # BLD AUTO: 0.7 K/UL (ref 0–1)
MONOCYTES NFR BLD AUTO: 5 %
NEUTROPHILS # BLD AUTO: 9 K/UL (ref 1.3–7.7)
NEUTROPHILS NFR BLD AUTO: 64 %
PLATELET # BLD AUTO: 155 K/UL (ref 150–450)
POTASSIUM SERPL-SCNC: 4.5 MMOL/L (ref 3.5–5.1)
SODIUM SERPL-SCNC: 136 MMOL/L (ref 137–145)
TRIGL SERPL-MCNC: 149 MG/DL (ref ?–150)
WBC # BLD AUTO: 14.2 K/UL (ref 3.8–10.6)

## 2021-02-03 PROCEDURE — B2111ZZ FLUOROSCOPY OF MULTIPLE CORONARY ARTERIES USING LOW OSMOLAR CONTRAST: ICD-10-PCS

## 2021-02-03 PROCEDURE — 4A023N7 MEASUREMENT OF CARDIAC SAMPLING AND PRESSURE, LEFT HEART, PERCUTANEOUS APPROACH: ICD-10-PCS

## 2021-02-03 RX ADMIN — CEFAZOLIN SCH MLS/HR: 330 INJECTION, POWDER, FOR SOLUTION INTRAMUSCULAR; INTRAVENOUS at 13:13

## 2021-02-03 RX ADMIN — DEXTROSE MONOHYDRATE, SODIUM CHLORIDE, AND POTASSIUM CHLORIDE SCH: 50; 4.5; 1.49 INJECTION, SOLUTION INTRAVENOUS at 06:11

## 2021-02-03 RX ADMIN — HEPARIN SODIUM SCH: 10000 INJECTION, SOLUTION INTRAVENOUS at 17:57

## 2021-02-03 RX ADMIN — INSULIN ASPART SCH UNIT: 100 INJECTION, SOLUTION INTRAVENOUS; SUBCUTANEOUS at 06:54

## 2021-02-03 RX ADMIN — METOPROLOL TARTRATE SCH MG: 25 TABLET, FILM COATED ORAL at 09:34

## 2021-02-03 RX ADMIN — ISOSORBIDE MONONITRATE SCH MG: 30 TABLET, EXTENDED RELEASE ORAL at 09:33

## 2021-02-03 RX ADMIN — INSULIN ASPART SCH UNIT: 100 INJECTION, SOLUTION INTRAVENOUS; SUBCUTANEOUS at 20:29

## 2021-02-03 RX ADMIN — NITROGLYCERIN SCH INCH: 20 OINTMENT TOPICAL at 13:09

## 2021-02-03 RX ADMIN — METOPROLOL TARTRATE SCH MG: 25 TABLET, FILM COATED ORAL at 20:29

## 2021-02-03 RX ADMIN — DEXTROSE MONOHYDRATE, SODIUM CHLORIDE, AND POTASSIUM CHLORIDE SCH: 50; 4.5; 1.49 INJECTION, SOLUTION INTRAVENOUS at 17:38

## 2021-02-03 RX ADMIN — CEFAZOLIN SCH: 330 INJECTION, POWDER, FOR SOLUTION INTRAMUSCULAR; INTRAVENOUS at 17:39

## 2021-02-03 RX ADMIN — CEFAZOLIN SCH MLS/HR: 330 INJECTION, POWDER, FOR SOLUTION INTRAMUSCULAR; INTRAVENOUS at 17:44

## 2021-02-03 RX ADMIN — CEFAZOLIN SCH MLS/HR: 330 INJECTION, POWDER, FOR SOLUTION INTRAMUSCULAR; INTRAVENOUS at 13:14

## 2021-02-03 RX ADMIN — INSULIN ASPART SCH UNIT: 100 INJECTION, SOLUTION INTRAVENOUS; SUBCUTANEOUS at 17:42

## 2021-02-03 RX ADMIN — INSULIN ASPART SCH UNIT: 100 INJECTION, SOLUTION INTRAVENOUS; SUBCUTANEOUS at 13:09

## 2021-02-03 RX ADMIN — PANTOPRAZOLE SODIUM SCH MG: 40 INJECTION, POWDER, FOR SOLUTION INTRAVENOUS at 09:33

## 2021-02-03 RX ADMIN — NITROGLYCERIN SCH INCH: 20 OINTMENT TOPICAL at 06:09

## 2021-02-03 RX ADMIN — INSULIN ASPART SCH UNIT: 100 INJECTION, SOLUTION INTRAVENOUS; SUBCUTANEOUS at 13:10

## 2021-02-03 RX ADMIN — CEFAZOLIN SCH MLS/HR: 330 INJECTION, POWDER, FOR SOLUTION INTRAMUSCULAR; INTRAVENOUS at 17:43

## 2021-02-03 RX ADMIN — CEFAZOLIN SCH: 330 INJECTION, POWDER, FOR SOLUTION INTRAMUSCULAR; INTRAVENOUS at 00:37

## 2021-02-03 RX ADMIN — NITROGLYCERIN SCH INCH: 20 OINTMENT TOPICAL at 00:21

## 2021-02-03 RX ADMIN — DEXTROSE MONOHYDRATE, SODIUM CHLORIDE, AND POTASSIUM CHLORIDE SCH: 50; 4.5; 1.49 INJECTION, SOLUTION INTRAVENOUS at 22:52

## 2021-02-03 RX ADMIN — ASPIRIN 81 MG CHEWABLE TABLET SCH MG: 81 TABLET CHEWABLE at 09:34

## 2021-02-03 NOTE — P.CRDCN
History of Present Illness


History of present illness: 





HISTORY OF PRESENTING ILLNESS


This is a pleasant 55-year-old  male past medical history 

significant for coronary artery disease, hypertension, dyslipidemia and diabetes

mellitus.  He [facility]. We

have been asked to see in consultation for chest pain and elevated troponins.  

He states he is currently in town visiting a friend.  While he was at his 

friend's home he started coughing and states he had an episode of blood in his 

sputum prompting him to come to the emergency room for further evaluation.  On 

further discussion he also admits that for the previous few weeks he has been 

experiencing chest discomfort with exertion specifically when he walks up stairs

to his apartment.  He has been taking nitro on a regular basis.  He also has 

some exertional shortness of breath associated with his chest discomfort.  He 

states he is noncompliant with his medications in terms of his heart as well as 

his diabetes.  His cardiologist had recommended an AICD implantation 2 years ago

and the patient states that time he has not returned to his office.  Previously 

in  he underwent cardiac catheterization here that revealed 30-40% disease 

of the distal LAD, total occlusion of the proximal circumflex and total occlusi

on of the proximal RCA.  Most recent echocardiogram in 2016 revealed impaired LV

systolic function with ejection fraction 35-40%.  Repeat echocardiogram 

yesterday revealed impaired LV systolic function with ejection fraction 30-35%, 

severe concentric LVH, mild MR and mild TR noted.





DIAGNOSTICS


EKG reveals sinus rhythm deep T-wave inversion suggestive of severe LVH.


Telemetry tracings indicate mechanism with episodes of SVT.


Chest xray reveals pulmonary venous congestion with no overt heart failure.  

Repeat today reveals a slender atelectasis.


CTA is negative for pulmonary embolism with scattered nonspecific groundglass 

opacities bilaterally.


Laboratory reviewed, WBC on admission 17.4 repeat today 14.2, .  Hemoglobin 13, 

platelets 155, d-dimer 1.06, sodium 136, potassium 4.5, creatinine 1.25, 

magnesium 2.3, troponin 0.671, 0.892, 1.35, proBNP 5060,  and HDL 62. 


Current cardiac medications include amlodipine 2.5 mg daily, Imdur 90 mg daily, 

lisinopril 10 mg daily and atorvastatin 40 mg daily.





REVIEW OF SYSTEMS


At the time of my exam:


CONSTITUTIONAL: Denies fever or chills.


CARDIOVASCULAR: Denies chest pain, shortness of breath, orthopnea, PND or 

palpitations.


RESPIRATORY: Denies cough. 


GASTROINTESTINAL: Denies abdominal pain, diarrhea, constipation, nausea or 

vomiting.


MUSCULOSKELETAL: Denies myalgias.


NEUROLOGIC: Denies numbness, tingling, headacbe or weakness.


ENDOCRINE: Denies fatigue, weight change,  polydipsia or polyurina.


GENITOURINARY: Denies burning, hematuria or urgency with micturation.


HEMATOLOGIC: Denies history of anemia or bleeding. 





PHYSICAL EXAMINATION


Blood pressure 158/107 heart rate 77 afebrile and maintaining oxygen saturation 

on room air.


CONSTITUTIONAL: No apparent distress. 


HEENT: Head is normocephalic. Pupils are equal, round. Sclerae anicteric. Mucous

membranes of the mouth are moist.  No JVD. No carotid bruit.


CHEST EXAMINATION: Lungs are clear to auscultation. No chest wall tenderness is 

noted on palpation or with deep breathing. 


HEART EXAMINATION: Regular rate and rhythm. S1, S2 heard. No murmurs, gallops or

rub.


ABDOMEN: Soft, nontender. Positive bowel sounds.


EXTREMITIES: 2+ peripheral pulses, no lower extremity edema and no calf 

tenderness.


NEUROLOGIC EXAMINATION: Patient is awake, alert and oriented x3. 





ASSESSMENT


Non-ST elevated myocardial infarction


Unstable angina


Chronic systolic heart failure


Leukocytosis


Diabetes mellitus, uncontrolled


Hypertension, noncompliant


Dyslipidemia





PLAN


Recommend proceeding with cardiac catheterization to assess for progression of 

underlying coronary artery disease.  I have discussed the risks, benefits and 

alternative therapies for the above-mentioned procedure and for both 

sedation/analgesia as well as necessary blood product administration, if 

indicated, as they pertain to this patient.  The patient has indicated 

understanding and acceptance of the risks and procedures discussed.  Oxygen to 

be answered appropriately and he is agreeable to move forward with the above 

stated procedure.


Initiate Lopressor 25 mg twice a day to his daily regimen.


Increase atorvastatin to 80 mg daily.


Further recommendations to follow based upon clinical course.


Thank you kindly for this consultation.





Nurse Practitioner note has been reviewed, I agree with a documented findings a

nd plan of care.  Patient was seen and examined.











Past Medical History


Past Medical History: Coronary Artery Disease (CAD), Chest Pain / Angina, Heart 

Failure, Diabetes Mellitus, Hyperlipidemia, Hypertension, Myocardial Infarction 

(MI)


Additional Past Medical History / Comment(s): Ischemic cardiomyopathy, chronic 

angina, NIDDM type II, L hand numbness thought d/t pinched nerve, L eye vision 

changes.


Last Myocardial Infarction Date:: 


History of Any Multi-Drug Resistant Organisms: MRSA


Date of last positivie culture/infection: 


MDRO Source:: left index finger


Past Surgical History: Heart Catheterization, Joint Replacement


Additional Past Surgical History / Comment(s): Cardiac caths, total L hip 

arthroplasty.


Past Anesthesia/Blood Transfusion Reactions: No Reported Reaction


Additional Past Anesthesia/Blood Transfusion Reaction / Comment(s): Never had 

past transfusion


Past Psychological History: PTSD


Additional Psychological History / Comment(s): Pt is living in [facility] facility with other veterans.  Pt uses no assistive device.  He does not 

drive, [facility].  He was a marine.


Smoking Status: Light tobacco smoker


Past Alcohol Use History: None Reported


Additional Past Alcohol Use History / Comment(s): Pt started smokig in  and 

quit in  but states he will take a couple puffs off a cigarette a day.


Past Drug Use History: Marijuana


Additional Drug Use History / Comment(s): Occasional marijuana





- Past Family History


  ** Father Brother(s)


Family Medical History: Coronary Artery Disease (CAD), Myocardial Infarction 

(MI)


Additional Family Medical History / Comment(s): Father  of a MI at the age 

of 53 yrs.





  ** Brother(s)


Family Medical History: Myocardial Infarction (MI)


Additional Family Medical History / Comment(s): Pt's younger brother  of a 

MI at the age of 41 yrs.  He does not have contact with his older brother.





  ** Mother


History Unknown: Yes


Additional Family Medical History / Comment(s): Mother is , pt cannot 

recall her medical hx





Medications and Allergies


                                Home Medications











 Medication  Instructions  Recorded  Confirmed  Type


 


Furosemide [Lasix] 40 mg PO DAILY #30 tab 16 Rx


 


Nitroglycerin Sl Tabs [Nitrostat] 0.4 mg SUBLINGUAL Q5M PRN #30 tab 16 Rx


 


lisinopriL [Zestril] 10 mg PO DAILY #30 tab 16 Rx


 


Atorvastatin [Lipitor] 40 mg PO DAILY 21 History


 


Isosorbide Mononitrate ER [Imdur] 90 mg PO DAILY 21 History


 


amLODIPine [Norvasc] 2.5 mg PO DAILY 21 History


 


metFORMIN HCL [Glucophage] 500 mg PO BID 21 History








                                    Allergies











Allergy/AdvReac Type Severity Reaction Status Date / Time


 


No Known Allergies Allergy   Verified 21 10:29














Physical Exam


Vitals: 


                                   Vital Signs











  Temp Pulse Pulse Resp BP BP Pulse Ox


 


 21 04:00  98 F   83  17   142/96  97


 


 21 02:00    94  17   


 


 21 00:34       150/85 


 


 21 00:24    94    153/76 


 


 21 00:20    92  17   146/81  94 L


 


 21 00:00  98.4 F   94  19   113/84  96


 


 21 20:00  99.0 F   81  18   125/75  97


 


 21 16:00  98.2 F   95  18   125/62  95


 


 21 13:17  98.5 F  92   16  141/87   96


 


 21 11:52   92   18  134/91   96


 


 21 11:00  98.6 F  91   16  144/95   100


 


 21 09:30   96   14  136/87   97


 


 21 09:12   96   16  136/87   100


 


 21 09:01    97  15   


 


 21 08:54  99.1 F  98   16  136/87   97








                                Intake and Output











 21





 22:59 06:59 14:59


 


Intake Total 37.882 441 


 


Balance 37.882 441 


 


Intake:   


 


  Intake, IV Titration 37.882 441 





  Amount   


 


    Heparin Sod,Pork in 0.45%  141 





    NaCl 25,000 unit In 0.45   





    % NaCl 1 250ml.bag @ 9.   





    585 UNITS/KG/HR 10 mls/hr   





    IV .Q24H [facility] Rx#:   





    [id number]   


 


    Insulin Regular 100 unit 37.882  





    In Sodium Chloride 0.9%   





    100 ml @ 0.1 UNITS/KG/HR   





    10.537 mls/hr IV .Q9H36M   





    [facility] Rx#:[id number]   


 


    Sodium Chloride 0.9% 1,  300 





    000 ml @ 50 mls/hr IV .   





    Q20H [facility] Rx#:[id number]   


 


  Oral 0  


 


Other:   


 


  Voiding Method Toilet Toilet 


 


  # Voids 0 2 


 


  Weight  99.5 kg 














Results





                                 21 07:56





                                 21 07:56


                                 Cardiac Enzymes











  21 Range/Units





  09:10 09:10 12:19 


 


AST  43    (17-59)  U/L


 


Troponin I   0.671 H*  0.892 H*  (0.000-0.034)  ng/mL














  21 Range/Units





  15:15 


 


AST   (17-59)  U/L


 


Troponin I  1.350 H*  (0.000-0.034)  ng/mL








                                   Coagulation











  21 Range/Units





  09:10 18:41 23:37 


 


PT  10.5    (9.0-12.0)  sec


 


APTT  19.6 L  38.0 H  33.8 H  (22.0-30.0)  sec








                                       CBC











  21 Range/Units





  09:10 


 


WBC  17.4 H  (3.8-10.6)  k/uL


 


RBC  5.01  (4.30-5.90)  m/uL


 


Hgb  14.4  (13.0-17.5)  gm/dL


 


Hct  42.7  (39.0-53.0)  %


 


Plt Count  181  (150-450)  k/uL








                          Comprehensive Metabolic Panel











  21 Range/Units





  09:10 15:15 18:41 


 


Sodium  131 L  139  139  (137-145)  mmol/L


 


Potassium  4.9  3.9  4.0  (3.5-5.1)  mmol/L


 


Chloride  98  101  105  ()  mmol/L


 


Carbon Dioxide  14 L  22  24  (22-30)  mmol/L


 


BUN  32 H  30 H  30 H  (9-20)  mg/dL


 


Creatinine  1.62 H  1.62 H  1.49 H  (0.66-1.25)  mg/dL


 


Glucose  579 H*  266 H  87  (74-99)  mg/dL


 


Calcium  9.9    (8.4-10.2)  mg/dL


 


AST  43    (17-59)  U/L


 


ALT  29    (4-49)  U/L


 


Alkaline Phosphatase  121    ()  U/L


 


Total Protein  7.7    (6.3-8.2)  g/dL


 


Albumin  4.4    (3.5-5.0)  g/dL








                               Current Medications











Generic Name Dose Route Start Last Admin





  Trade Name Freq  PRN Reason Stop Dose Admin


 


Amlodipine Besylate  2.5 mg  21 09:00 





  Amlodipine 2.5 Mg Tab  PO  





  DAILY [facility]  


 


Atorvastatin Calcium  40 mg  21 09:00 





  Atorvastatin 40 Mg Tab  PO  





  DAILY [facility]  


 


Azithromycin  500 mg  21 09:00 





  Azithromycin 500 Mg Tab  PO  21 09:01 





  DAILY [facility]  


 


Heparin Sodium (Porcine)  0 unit  21 11:27 





  Heparin Sodium,Porcine 5,000 Unit/Ml 1 Ml Vial  IV  





  Q6HR PRN  





  Low PTT  





  Protocol  


 


Heparin Sodium/Sodium Chloride  250 mls @ 10 mls/hr  21 11:30  21 02

:35





  25,000 unit/ Sodium Chloride  IV   12.46 units/kg/hr





  .Q24H [facility]   13 mls/hr





    Titration





  Protocol  





  9.585 UNITS/KG/HR  


 


Sodium Chloride  1,000 mls @ 150 mls/hr  21 13:45  21 00:37





  Saline 0.9%  IV   Not Given





  .Q6H40M [facility]  


 


Potassium Chloride/Dextrose/Sod Cl  1,000 mls @ 150 mls/hr  21 17:00  

21 06:11





  D5%-1/2ns-Kcl 20 Meq/L Iv Solution  IV   Not Given





  .Q6H40M [facility]  


 


Sodium Chloride  1,000 mls @ 50 mls/hr  21 20:00  21 21:08





  Saline 0.9%  IV   50 mls/hr





  .Q20H [facility]   Administration


 


Insulin Aspart  5 unit  21 07:30 





  Insulin Aspart (Novolog) 100 Unit/Ml Vial  SQ  





  AC-TID [facility]  


 


Insulin Aspart  0 unit  21 21:00  21 06:54





  Insulin Aspart (Novolog) 100 Unit/Ml Vial  SQ   9 unit





  ACHS [facility]   Administration





  Protocol  


 


Insulin Detemir  25 unit  21 21:00  21 21:07





  Insulin Detemir (Levemir) 100 Unit/Ml Syr  SQ   25 unit





  HS [facility]   Administration


 


Isosorbide Mononitrate  90 mg  21 09:00 





  Isosorbide Mononitrate Er 30 Mg Tab.Er.24h  PO  





  DAILY [facility]  


 


Metoprolol Tartrate  25 mg  21 09:00 





  Metoprolol Tartrate 25 Mg Tab  PO  





  BID [facility]  


 


Miscellaneous Information  1 each  21 11:27 





  Pneumonia Protocol Utilized 1 Each Misc  PO  





  ONCE PRN  





  Per Protocol  


 


Miscellaneous Information  1 each  21 13:39 





  Magnesium Replacement Protocol 1 Each Misc  MISCELLANE  





  DAILY PRN  





  Per Protocol  





  Protocol  


 


Miscellaneous Information  1 each  21 13:39 





  Potassium Replacement Protocol 1 Each Misc  MISCELLANE  





  DAILY PRN  





  Per Protocol  


 


Nitroglycerin  1 inch  21 12:00  21 06:09





  Nitroglycerin Oint 1 Inch/Gm Packet  TOPICAL   1 inch





  Q6HR [facility]   Administration


 


Nitroglycerin  0.4 mg  21 13:43 





  Nitroglycerin Sl Tabs 0.4 Mg Tab  SUBLINGUAL  





  Q5M PRN  





  Chest Pain  


 


Pantoprazole Sodium  40 mg  21 14:15  21 17:27





  Pantoprazole 40 Mg/10 Ml Vial  IVP   40 mg





  DAILY [facility]   Administration








                                Intake and Output











 21





 22:59 06:59 14:59


 


Intake Total 37.882 441 


 


Balance 37.882 441 


 


Intake:   


 


  Intake, IV Titration 37.882 441 





  Amount   


 


    Heparin Sod,Pork in 0.45%  141 





    NaCl 25,000 unit In 0.45   





    % NaCl 1 250ml.bag @ 9.   





    585 UNITS/KG/HR 10 mls/hr   





    IV .Q24H [facility] Rx#:   





    [id number]   


 


    Insulin Regular 100 unit 37.882  





    In Sodium Chloride 0.9%   





    100 ml @ 0.1 UNITS/KG/HR   





    10.537 mls/hr IV .Q9H36M   





    [facility] Rx#:[id number]   


 


    Sodium Chloride 0.9% 1,  300 





    000 ml @ 50 mls/hr IV .   





    Q20H [facility] Rx#:[id number]   


 


  Oral 0  


 


Other:   


 


  Voiding Method Toilet Toilet 


 


  # Voids 0 2 


 


  Weight  99.5 kg 








                                        





                                 21 09:10 





                                 21 18:41

## 2021-02-03 NOTE — XR
EXAMINATION TYPE: XR chest 2V

 

DATE OF EXAM: 2/3/2021

 

COMPARISON: Prior chest x-ray and CT 2/2/2021

 

HISTORY: Pneumonia

 

TECHNIQUE:  Frontal and lateral views of the chest are obtained.

 

FINDINGS:  Some minimal patchy basilar density is present. Groundglass opacities seen on CT not as we
ll seen on plain film. There is no pneumothorax or pleural effusion. Cardiac mediastinal silhouette i
s stable. There are overlying artifacts.

 

IMPRESSION:  Basilar atelectasis, correlate for pneumonia

## 2021-02-03 NOTE — P.PN
Subjective





55-year-old gentleman came in with complains of a not feeling well and tiredness

was comparing of chest pain sharp in nature retrosternal area not associated 

with food patient has some pleuritic competent of the chest pain.  Patient was 

bit short of breath as well.  Patient had a CT angios the chest x-ray pulmonary 

embolism which showed some groundglass T's although patient is negative for 

covid19.  Patient does have leukocytosis no fever.  Patient also found to have 

elevated creatinine of 1.6 to along with the sodium of 131 was also found to be 

in diabetic ketoacidosis with highly elevated blood sugars patient agrees that 

he is not competent with his diet recommendations are not complaint with his 

medications.  Patient has a mildly elevated troponin of 0.67 and 0.89.  Patient 

does have history of ischemic cardiomyopathy with EF of around 20-25% which are 

actually improved to 35-40%.  Patient does have elevated BNP.





02/03/2021


Patient has elevated troponin up to 1.6 patient underwent cardiac 

catheterization patient had clot chronic total occlusion of RCA and circumflex 

with collaterals from LAD because of this no further intervention was done and 

equal management is being advised by cardiology because of the collaterals from 

left anterior descending.  Patient is presently not in heart failure 

exacerbation DKA resolved the blood sugars although still not well-controlled 

increasing the pre-meal insulin as well as long-acting insulin.  Patient's blood

cultures came back positive for gram-positive cocci in chains patient will be 

started on Rocephin source of infection is not clear can be pneumonia although 

there is no lobar infiltrate which repeat typically see with pneumococcal 

pneumonia, infectious disease will be consulted











Constitutional: Denied any fatigue denied any fever.


Cardio vascular: denied any chest pain, palpitations


Gastrointestinal denied any nausea vomiting


Pulmonary: Denied any shortness of breath cough


Neurologic denied any new focal deficits





All inpatient medications were reviewed and appropriate changes in these 

medications as dictated in the interval history and assessment and plan.





Objective





- Vital Signs


Vital signs: 


                                   Vital Signs











Temp  98 F   02/03/21 04:00


 


Pulse  77   02/03/21 08:00


 


Resp  16   02/03/21 08:00


 


BP  158/107   02/03/21 08:00


 


Pulse Ox  99   02/03/21 08:00








                                 Intake & Output











 02/02/21 02/03/21 02/03/21





 18:59 06:59 18:59


 


Intake Total 35.564 443.318 100


 


Balance 35.564 443.318 100


 


Weight 104.326 kg 99.5 kg 


 


Intake:   


 


  IV   100


 


  Intake, IV Titration 35.564 443.318 





  Amount   


 


    Heparin Sod,Pork in 0.45%  141 





    NaCl 25,000 unit In 0.45   





    % NaCl 1 250ml.bag @ 9.   





    585 UNITS/KG/HR 10 mls/hr   





    IV .Q24H [facility] Rx#:   





    [id number]   


 


    Insulin Regular 100 unit 35.564 2.318 





    In Sodium Chloride 0.9%   





    100 ml @ 0.1 UNITS/KG/HR   





    10.537 mls/hr IV .Q9H36M   





    [facility] Rx#:[id number]   


 


    Sodium Chloride 0.9% 1,  300 





    000 ml @ 50 mls/hr IV .   





    Q20H [facility] Rx#:[id number]   


 


  Oral 0  


 


Other:   


 


  Voiding Method Toilet Toilet Toilet


 


  # Voids 0 2 














- Exam





PHYSICAL EXAMINATION: 





GENERAL: The patient is alert and oriented x3, not in any acute distress. Well 

developed, well nourished. 


HEENT: Pupils are round and equally reacting to light. EOMI. No scleral icterus.

No conjunctival pallor. Normocephalic, atraumatic. No pharyngeal erythema. No 

thyromegaly. 


CARDIOVASCULAR: S1 and S2 present. No murmurs, rubs, or gallops. 


PULMONARY: Chest is clear to auscultation, no wheezing or crackles. 


ABDOMEN: Soft, nontender, nondistended, normoactive bowel sounds. No palpable 

organomegaly. 


MUSCULOSKELETAL: No joint swelling or deformity.


EXTREMITIES: No cyanosis, clubbing, or pedal edema. 


NEUROLOGICAL: Gross neurological examination did not reveal any focal deficits. 


SKIN: No rashes. 








- Labs


CBC & Chem 7: 


                                 02/03/21 07:56





                                 02/03/21 07:56


Labs: 


                  Abnormal Lab Results - Last 24 Hours (Table)











  02/02/21 02/02/21 02/02/21 Range/Units





  12:19 12:57 15:15 


 


WBC     (3.8-10.6)  k/uL


 


Neutrophils #     (1.3-7.7)  k/uL


 


APTT     (22.0-30.0)  sec


 


Sodium     (137-145)  mmol/L


 


BUN     (9-20)  mg/dL


 


Creatinine     (0.66-1.25)  mg/dL


 


Glucose     (74-99)  mg/dL


 


POC Glucose (mg/dL)   470 H   (75-99)  mg/dL


 


Troponin I  0.892 H*   1.350 H*  (0.000-0.034)  ng/mL


 


Cholesterol     (<200)  mg/dL


 


LDL Cholesterol, Calc     (0-99)  mg/dL


 


HDL Cholesterol     (40-60)  mg/dL














  02/02/21 02/02/21 02/02/21 Range/Units





  15:15 16:15 17:16 


 


WBC     (3.8-10.6)  k/uL


 


Neutrophils #     (1.3-7.7)  k/uL


 


APTT     (22.0-30.0)  sec


 


Sodium     (137-145)  mmol/L


 


BUN  30 H    (9-20)  mg/dL


 


Creatinine  1.62 H    (0.66-1.25)  mg/dL


 


Glucose  266 H    (74-99)  mg/dL


 


POC Glucose (mg/dL)   171 H  126 H  (75-99)  mg/dL


 


Troponin I     (0.000-0.034)  ng/mL


 


Cholesterol     (<200)  mg/dL


 


LDL Cholesterol, Calc     (0-99)  mg/dL


 


HDL Cholesterol     (40-60)  mg/dL














  02/02/21 02/02/21 02/02/21 Range/Units





  18:41 18:41 19:01 


 


WBC     (3.8-10.6)  k/uL


 


Neutrophils #     (1.3-7.7)  k/uL


 


APTT  38.0 H    (22.0-30.0)  sec


 


Sodium     (137-145)  mmol/L


 


BUN   30 H   (9-20)  mg/dL


 


Creatinine   1.49 H   (0.66-1.25)  mg/dL


 


Glucose     (74-99)  mg/dL


 


POC Glucose (mg/dL)    107 H  (75-99)  mg/dL


 


Troponin I     (0.000-0.034)  ng/mL


 


Cholesterol     (<200)  mg/dL


 


LDL Cholesterol, Calc     (0-99)  mg/dL


 


HDL Cholesterol     (40-60)  mg/dL














  02/02/21 02/02/21 02/02/21 Range/Units





  19:36 20:30 23:37 


 


WBC     (3.8-10.6)  k/uL


 


Neutrophils #     (1.3-7.7)  k/uL


 


APTT    33.8 H  (22.0-30.0)  sec


 


Sodium     (137-145)  mmol/L


 


BUN     (9-20)  mg/dL


 


Creatinine     (0.66-1.25)  mg/dL


 


Glucose     (74-99)  mg/dL


 


POC Glucose (mg/dL)  127 H  115 H   (75-99)  mg/dL


 


Troponin I     (0.000-0.034)  ng/mL


 


Cholesterol     (<200)  mg/dL


 


LDL Cholesterol, Calc     (0-99)  mg/dL


 


HDL Cholesterol     (40-60)  mg/dL














  02/03/21 02/03/21 02/03/21 Range/Units





  02:10 06:50 07:56 


 


WBC     (3.8-10.6)  k/uL


 


Neutrophils #     (1.3-7.7)  k/uL


 


APTT     (22.0-30.0)  sec


 


Sodium    136 L  (137-145)  mmol/L


 


BUN    27 H  (9-20)  mg/dL


 


Creatinine     (0.66-1.25)  mg/dL


 


Glucose    313 H  (74-99)  mg/dL


 


POC Glucose (mg/dL)  322 H  326 H   (75-99)  mg/dL


 


Troponin I     (0.000-0.034)  ng/mL


 


Cholesterol    252 H  (<200)  mg/dL


 


LDL Cholesterol, Calc    160 H  (0-99)  mg/dL


 


HDL Cholesterol    62 H  (40-60)  mg/dL














  02/03/21 02/03/21 02/03/21 Range/Units





  07:56 07:56 11:50 


 


WBC   14.2 H   (3.8-10.6)  k/uL


 


Neutrophils #   9.0 H   (1.3-7.7)  k/uL


 


APTT  39.9 H    (22.0-30.0)  sec


 


Sodium     (137-145)  mmol/L


 


BUN     (9-20)  mg/dL


 


Creatinine     (0.66-1.25)  mg/dL


 


Glucose     (74-99)  mg/dL


 


POC Glucose (mg/dL)    255 H  (75-99)  mg/dL


 


Troponin I     (0.000-0.034)  ng/mL


 


Cholesterol     (<200)  mg/dL


 


LDL Cholesterol, Calc     (0-99)  mg/dL


 


HDL Cholesterol     (40-60)  mg/dL








                      Microbiology - Last 24 Hours (Table)











 02/02/21 12:42 Blood Culture - Final





 Blood 














Assessment and Plan


Plan: 


-Diabetic ketoacidosis patient has type 2 diabetes mellitus : Ketoacidosis 

resolved patient the blood sugars although still not well controlled will 

increase the dose of long-acting as well as prenatal insulin


-Chest pain, ruled out for pulmonary embolism.  Possibility of non-ST elevation 

myocardial infarction  had a cardiac catheterization and cardiac cath 

report as mentioned above.


-Bacteremia with the strep species repeat blood cultures and without and patient

was started on Rocephin infectious disease will be consulted patient may have 

pneumococcal pneumonia.  Considering groundglass opacities we will repeat Covid 

19


- history of cardiomyopathy previous to be ischemic cardiomyopathy severe 

systolic dysfunction doesn't appear to be in acute exacerbation but the patient 

will receive fluids, we'll closely monitor for any heart failure exacerbation.  

He 68 of his 30-35%


-Coronary artery disease


-Type 2 diabetes mellitus uncontrolled elevated blood sugars secondary to 

noncompliance


-Hyperlipidemia


-Hypertension: Patient will be started on amlodipine and lisinopril will be held

temporarily


-Acute renal failure probably secondary to intravascular depletion from DKA hold

off on a lisinopril and Lasix at this time.  Improved creatinine


-DVT prophylaxis with the subclavian 7 GI prophylaxis with Protonix

## 2021-02-03 NOTE — ECHOF
Referral Reason:chf



MEASUREMENTS

--------

HEIGHT: 172.7 cm

WEIGHT: 104.3 kg

BP: 

IVSd:   2.4 cm     (0.6 - 1.1)

LVIDd:   5.1 cm     (3.9 - 5.3)

LVPWd:   1.2 cm     (0.6 - 1.1)

IVSs:   2.6 cm

LVIDs:   5.1 cm

LVPWs:   0.9 cm

LAESV Index (A-L):   27.78 ml/m

Ao Diam:   4.0 cm     (2.0 - 3.7)

MV EXCURSION:   18.872 mm     (> 18.000)

MV EF SLOPE:   81 mm/s     (70 - 150)

EPSS:   1.2 cm

MV E Alden:   0.49 m/s

MV DecT:   190 ms

MV A Alden:   0.83 m/s

MV E/A Ratio:   0.58 

RAP:   5.00 mmHg

RVSP:   24.22 mmHg







FINDINGS

--------

Sinus rhythm.

This was a technically good study.

There is severe concentric left ventricular hypertrophy.   Overall left ventricular systolic function
 is moderate-severely impaired with, an EF between 30 - 35 %.

The right ventricle is normal in size.

LA is midly dilated 29-33ml/m2.

The right atrial size is normal.

There is mild aortic valve sclerosis.   There is no evidence of aortic regurgitation.

Mild mitral annular calcification present.   Mild mitral regurgitation is present.

Mild tricuspid regurgitation present.   Right ventricular systolic pressure is normal at < 35 mmHg.

There is no pulmonic regurgitation present.

The aortic root size is normal.

There is no pericardial effusion.



CONCLUSIONS

--------

1. There is severe concentric left ventricular hypertrophy.

2. Overall left ventricular systolic function is moderate-severely impaired with, an EF between 30 - 
35 %.

3. The right ventricle is normal in size.

4. LA is midly dilated 29-33ml/m2.

5. The right atrial size is normal.

6. There is mild aortic valve sclerosis.

7. Mild mitral annular calcification present.

8. Mild mitral regurgitation is present.

9. Mild tricuspid regurgitation present.

10. There is no pulmonic regurgitation present.

11. The aortic root size is normal.

12. There is no pericardial effusion.





SONOGRAPHER: Cait Leon RDCS

## 2021-02-03 NOTE — P.CARDCATH
Date of Procedure: 02/03/21


Preoperative Diagnosis: 


Non-STEMI


Postoperative Diagnosis: 


Stable coronary artery disease with total occlusion of the RCA and circumflex.  

No critical lesion in the LAD


Procedure(s) Performed: 


Left heart catheterization without left ventriculography


Description of Procedure: 


HISTORY: This is a 55-year-old gentleman with known ischemic heart disease with 

previously documented subtotal occlusion of the right coronary artery and also 

circumflex coronary artery is admitted to the hospital with complaints of 

exertional chest tightness and shortness of breath.  His creatinine was about 

1.4 on admission.  His troponin was elevated suggestive of possible non-STEMI.  

A cardiac catheterization is requested by Dr. Ham





CONSENT:I have discussed the risks, benefits and alternative therapies for the 

above-mentioned procedure and for both sedation/analgesia as well as necessary 

blood product administration, if indicated, as they pertain to this patient.  

The patient has indicated understanding and acceptance of the risks and 

procedures discussed.











PROCEDURE: Patient was brought to the lab in a fasting state.  Patient was given

some IV sedation.  The right groin is infiltrated with lidocaine and right 

femoral artery was entered using Seldinger technique.  A 6-Syrian catheter was 

left in place and selective coronary arteriography and left ventriculography was

performed.  Patient tolerated the procedure well.  Femoral angiogram was 

performed and Angio-Seal was applied for hemostasis.  No immediate complications

were noted and patient was transferred to [facility] in a stable condition





Conscious Sedation: Versed 1mg


Fentanyl 25 g


Duration 15minutes   


HEMODYNAMICS: Aortic pressure is 160/90.  The left ankle end-diastolic pressure 

is about 22.  There was no gradient across the aortic valve.





SELECTIVE CORONARY ARTERIOGRAPHY: 


                          LEFT MAIN: Normal length and free of occlusive disease


                          THE LEFT ANTERIOR DESCENDING CORONARY ARTERY: .  His 

is a good caliber vessel with mild ectasia.  It wraps around the apex.  No 

critical lesions noted.  It provides collaterals to the distal RCA


                          THE LEFT CIRCUMFLEX AND IS CORONARY ARTERY: Is a good 

caliber vessel which is near total occluded in the proximal portion.  There is 

faint antegrade flow into the OM branches


                          THE RIGHT CORONARY ARTERY: .  This is totally occluded

proximally and the distal RCA has collateral from the left coronary system





      





LEFT VENTRICULOGRAPHY: Not performed





FINAL IMPRESSION: .  Stable coronary artery disease with a total occlusion of 

the circumflex and right coronary artery.  LAD is free of any significant 

occlusive disease which provides collaterals to the RCA





PLAN: Maximum medical therapy and risk factor modification





PROGNOSIS: Guarded

## 2021-02-04 LAB
ANION GAP SERPL CALC-SCNC: 7 MMOL/L
BUN SERPL-SCNC: 19 MG/DL (ref 9–20)
CALCIUM SPEC-MCNC: 8.9 MG/DL (ref 8.4–10.2)
CHLORIDE SERPL-SCNC: 106 MMOL/L (ref 98–107)
CO2 SERPL-SCNC: 24 MMOL/L (ref 22–30)
GLUCOSE BLD-MCNC: 154 MG/DL (ref 75–99)
GLUCOSE BLD-MCNC: 237 MG/DL (ref 75–99)
GLUCOSE BLD-MCNC: 320 MG/DL (ref 75–99)
GLUCOSE BLD-MCNC: 357 MG/DL (ref 75–99)
GLUCOSE SERPL-MCNC: 269 MG/DL (ref 74–99)
MAGNESIUM SPEC-SCNC: 1.9 MG/DL (ref 1.6–2.3)
PLATELET # BLD AUTO: 155 K/UL (ref 150–450)
POTASSIUM SERPL-SCNC: 4.3 MMOL/L (ref 3.5–5.1)
SODIUM SERPL-SCNC: 137 MMOL/L (ref 137–145)

## 2021-02-04 RX ADMIN — ASPIRIN 81 MG CHEWABLE TABLET SCH MG: 81 TABLET CHEWABLE at 08:40

## 2021-02-04 RX ADMIN — INSULIN DETEMIR SCH UNIT: 100 INJECTION, SOLUTION SUBCUTANEOUS at 20:54

## 2021-02-04 RX ADMIN — ATORVASTATIN CALCIUM SCH MG: 80 TABLET, FILM COATED ORAL at 08:40

## 2021-02-04 RX ADMIN — INSULIN ASPART SCH UNIT: 100 INJECTION, SOLUTION INTRAVENOUS; SUBCUTANEOUS at 20:54

## 2021-02-04 RX ADMIN — INSULIN ASPART SCH UNIT: 100 INJECTION, SOLUTION INTRAVENOUS; SUBCUTANEOUS at 12:23

## 2021-02-04 RX ADMIN — CEFAZOLIN SCH: 330 INJECTION, POWDER, FOR SOLUTION INTRAMUSCULAR; INTRAVENOUS at 12:16

## 2021-02-04 RX ADMIN — INSULIN ASPART SCH UNIT: 100 INJECTION, SOLUTION INTRAVENOUS; SUBCUTANEOUS at 17:28

## 2021-02-04 RX ADMIN — INSULIN ASPART SCH UNIT: 100 INJECTION, SOLUTION INTRAVENOUS; SUBCUTANEOUS at 12:22

## 2021-02-04 RX ADMIN — METOPROLOL TARTRATE SCH MG: 25 TABLET, FILM COATED ORAL at 08:40

## 2021-02-04 RX ADMIN — NITROGLYCERIN PRN MG: 0.4 TABLET SUBLINGUAL at 20:53

## 2021-02-04 RX ADMIN — METOPROLOL TARTRATE SCH MG: 25 TABLET, FILM COATED ORAL at 20:53

## 2021-02-04 RX ADMIN — INSULIN ASPART SCH UNIT: 100 INJECTION, SOLUTION INTRAVENOUS; SUBCUTANEOUS at 08:44

## 2021-02-04 RX ADMIN — ISOSORBIDE MONONITRATE SCH MG: 30 TABLET, EXTENDED RELEASE ORAL at 08:40

## 2021-02-04 RX ADMIN — INSULIN ASPART SCH: 100 INJECTION, SOLUTION INTRAVENOUS; SUBCUTANEOUS at 15:51

## 2021-02-04 RX ADMIN — INSULIN ASPART SCH: 100 INJECTION, SOLUTION INTRAVENOUS; SUBCUTANEOUS at 14:28

## 2021-02-04 RX ADMIN — NITROGLYCERIN PRN MG: 0.4 TABLET SUBLINGUAL at 10:54

## 2021-02-04 RX ADMIN — PANTOPRAZOLE SODIUM SCH MG: 40 TABLET, DELAYED RELEASE ORAL at 07:02

## 2021-02-04 RX ADMIN — SODIUM CHLORIDE SCH MLS/HR: 9 INJECTION, SOLUTION INTRAVENOUS at 14:29

## 2021-02-04 RX ADMIN — INSULIN ASPART SCH UNIT: 100 INJECTION, SOLUTION INTRAVENOUS; SUBCUTANEOUS at 17:29

## 2021-02-04 NOTE — XR
EXAMINATION TYPE: XR chest 1V

 

DATE OF EXAM: 2/4/2021

 

COMPARISON: 2/3/2021

 

INDICATION: Short of breath, CHF

 

TECHNIQUE: Single frontal view of the chest is obtained.

 

FINDINGS:  

The heart size is enlarged.  

The pulmonary vasculature is normal.  

The lungs are clear.  

 

 

IMPRESSION:  

1. Cardiomegaly.

2. No acute pulmonary process.

## 2021-02-04 NOTE — P.PN
Subjective


Progress Note Date: 02/04/21


Principal diagnosis: 





Chest pain





55-year-old black male who presents to the emergency department on February 2, 

at 8:52 in the morning, with complaints of chest discomfort.  The patient states

that his symptoms began the day prior to admission but relatively mild and they 

got worse as a night went on and into the early morning.  When he presented to 

the emergency department, his chest pain was mild again.  Apparently he told the

ER physician was difficult to describe.  There is no radiation.  The patient did

apparently have some mild shortness of breath as well.  He also apparently had 

an episode of hemoptysis.  In addition, the patient was apparently having a very

harsh cough.  Pain increased with deep breathing.  There is no nausea or 

diaphoresis.  There is no fever.  There was no leg pain or leg swelling.  The 

patient underwent cardiac catheterization today, and the final impression of the

cardiologist was that the patient had stable coronary disease, with a total 

occlusion of the circumflex and right coronary arteries, and an LAD which is 

free of any significant occlusive disease which provides collaterals to the RCA.

 The recommendation was that of risk factor modification, and maximal medical 

therapy.  The patient has a history of CAD, angina, heart failure, diabetes, 

essential hypertension, and sciatica, among other things.  The patient does have

a history of substantial chronic tobacco use, and could have underlying COPD.  

He also has used marijuana in the past.  He has never seen a pulmonologist in 

the past.





The patient is seen today 02/04/2021 in follow-up on the selective care unit.  

He is currently awake and alert in no acute distress.  He is still having 

ongoing issues with chest pain.  Cardiac catheterization revealed coronary 

artery disease with a total occlusion of the circumflex and right coronary 

artery.  LAD was free of any significant occlusive disease which provides 

collaterals to the RCA.  The plan is to treat medically.  Chest x-ray reveals 

evidence of cardiomegaly but no acute pulmonary process.  Blood culture 

revealing streptococcus species.  Currently on vancomycin.  Sodium 137.  

Potassium 4.3.  Creatinine 1.09.  Glucose 269.  Remains on ceftriaxone.





Objective





- Vital Signs


Vital signs: 


                                   Vital Signs











Temp  98.6 F   02/04/21 08:00


 


Pulse  76   02/04/21 08:00


 


Resp  16   02/04/21 08:00


 


BP  122/68   02/04/21 08:00


 


Pulse Ox  96   02/04/21 08:00








                                 Intake & Output











 02/03/21 02/04/21 02/04/21





 18:59 06:59 18:59


 


Intake Total 461  


 


Balance 461  


 


Weight  101.3 kg 


 


Intake:   


 


    


 


  Oral 361  


 


Other:   


 


  Voiding Method Toilet Toilet Toilet














- Exam





GENERAL EXAM: Alert, pleasant 55-year-old male patient, on room air, in no 

apparent distress.


HEAD: Normocephalic.


EYES: Normal reaction of pupils, equal size.


NOSE: Clear with pink turbinates.


THROAT: No erythema or exudates.


NECK: No masses, no JVD.


CHEST: No chest wall deformity.


LUNGS: Equal air entry with no crackles, wheeze, rhonchi or dullness.


CVS: S1 and S2 normal with no audible murmur, regular rhythm.


ABDOMEN: No hepatosplenomegaly, normal bowel sounds, no guarding or rigidity.


SPINE: No scoliosis or deformity


SKIN: No rashes


CENTRAL NERVOUS SYSTEM: No focal deficits, tone is normal in all 4 extremities.


EXTREMITIES: There is no peripheral edema.  No clubbing, no cyanosis.  

Peripheral pulses are intact.





- Labs


CBC & Chem 7: 


                                 02/04/21 08:13





                                 02/04/21 08:13


Labs: 


                  Abnormal Lab Results - Last 24 Hours (Table)











  02/03/21 02/03/21 02/03/21 Range/Units





  07:56 17:08 20:14 


 


Glucose     (74-99)  mg/dL


 


POC Glucose (mg/dL)   353 H  378 H  (75-99)  mg/dL


 


Procalcitonin  1.61 H    (0.02-0.09)  ng/mL














  02/04/21 02/04/21 02/04/21 Range/Units





  07:33 08:13 12:08 


 


Glucose   269 H   (74-99)  mg/dL


 


POC Glucose (mg/dL)  320 H   237 H  (75-99)  mg/dL


 


Procalcitonin     (0.02-0.09)  ng/mL








                      Microbiology - Last 24 Hours (Table)











 02/03/21 06:31 Gram Stain - Preliminary





 Sputum Sputum Culture - Preliminary


 


 02/02/21 12:42 Blood Culture Gram Stain - Preliminary





 Blood 


 


 02/02/21 12:42 Blood Culture Gram Stain - Preliminary





 Blood Blood Culture - Preliminary





    Streptococcus species


 


 02/02/21 12:42 Blood Culture - Final





 Blood 


 


 02/02/21 12:42 Blood Culture - Final





 Blood 














Assessment and Plan


Assessment: 





Acute chest pain, status post cardiac catheterization, with stable coronary 

disease, with total occlusion of the circumflex and right coronary artery, and 

an LAD, which is free of any significant occlusive disease and provides 

collaterals to the right coronary artery.





Shortness of breath, with a pleuritic component, and a CT angiogram, that barker

ggest atypical pneumonia/coronavirus infection even though the patient's test 

was negative.  This could represent a viral pneumonia with viral pleurisy.





Probable underlying COPD from previous heavy tobacco use.





Blood cultures with evidence of gram-positive cocci in chains, from 02/02/2021.





Ongoing tobacco use with nicotine addiction.





History of chronic marijuana use.





History of coronary artery disease.





History of hyperlipidemia.





History of diabetes mellitus.





History of hypertension.





History of congestive heart failure.





History of angina pectoris.





Prior history of MRSA infection.





Plan:





The patient was seen and evaluated by Dr. Grajeda


Suspected pleuritic-type chest discomfort


Blood cultures with Streptococcus species


Currently on vancomycin and ceftriaxone


We'll continue to follow





I, the cosigning physician, performed a history & physical examination of the p

atient. Lungs sounds are clear.  Maintaining good O2 saturations in the 90s on 

room air.  I discussed the assessment and plan of care with my nurse 

practitioner, Sabrina White. I attest to the above note as dictated by her.

## 2021-02-04 NOTE — P.PN
Subjective





55-year-old gentleman came in with complains of a not feeling well and tiredness

was comparing of chest pain sharp in nature retrosternal area not associated 

with food patient has some pleuritic competent of the chest pain.  Patient was 

bit short of breath as well.  Patient had a CT angios the chest x-ray pulmonary 

embolism which showed some groundglass T's although patient is negative for 

covid19.  Patient does have leukocytosis no fever.  Patient also found to have 

elevated creatinine of 1.6 to along with the sodium of 131 was also found to be 

in diabetic ketoacidosis with highly elevated blood sugars patient agrees that 

he is not competent with his diet recommendations are not complaint with his 

medications.  Patient has a mildly elevated troponin of 0.67 and 0.89.  Patient 

does have history of ischemic cardiomyopathy with EF of around 20-25% which are 

actually improved to 35-40%.  Patient does have elevated BNP.





02/03/2021


Patient has elevated troponin up to 1.6 patient underwent cardiac 

catheterization patient had clot chronic total occlusion of RCA and circumflex 

with collaterals from LAD because of this no further intervention was done and 

equal management is being advised by cardiology because of the collaterals from 

left anterior descending.  Patient is presently not in heart failure 

exacerbation DKA resolved the blood sugars although still not well-controlled 

increasing the pre-meal insulin as well as long-acting insulin.  Patient's blood

cultures came back positive for gram-positive cocci in chains patient will be 

started on Rocephin source of infection is not clear can be pneumonia although 

there is no lobar infiltrate which repeat typically see with pneumococcal 

pneumonia, infectious disease will be consulted





02/04/2021


Patient blood cultures are showing the strep species finalization of the blood 

cultures are pending patient is on Rocephin which will be continued patient was 

also started on Vanco mycin which probably can be discontinued later we will 

repeat blood cultures for tomorrow again.  Patient possible source of infection 

is pneumonia.  Repeat tests for Covid is negative.  Patient blood sugars is 

still bit elevated increased her long-acting insulin as well as pre-meal 

insulin.  Patient's IV fluids will be discontinued











Constitutional: Denied any fatigue denied any fever.


Cardio vascular: denied any chest pain, palpitations


Gastrointestinal denied any nausea vomiting


Pulmonary: Denied any shortness of breath cough


Neurologic denied any new focal deficits





All inpatient medications were reviewed and appropriate changes in these 

medications as dictated in the interval history and assessment and plan.





Objective





- Vital Signs


Vital signs: 


                                   Vital Signs











Temp  98.6 F   02/04/21 08:00


 


Pulse  76   02/04/21 08:00


 


Resp  16   02/04/21 08:00


 


BP  122/68   02/04/21 08:00


 


Pulse Ox  96   02/04/21 08:00








                                 Intake & Output











 02/03/21 02/04/21 02/04/21





 18:59 06:59 18:59


 


Intake Total 461  


 


Balance 461  


 


Weight  101.3 kg 


 


Intake:   


 


    


 


  Oral 361  


 


Other:   


 


  Voiding Method Toilet Toilet 














- Exam





PHYSICAL EXAMINATION: 





GENERAL: The patient is alert and oriented x3, not in any acute distress. Well 

developed, well nourished. 


HEENT: Pupils are round and equally reacting to light. EOMI. No scleral icterus.

No conjunctival pallor. Normocephalic, atraumatic. No pharyngeal erythema. No 

thyromegaly. 


CARDIOVASCULAR: S1 and S2 present. No murmurs, rubs, or gallops. 


PULMONARY: Chest is clear to auscultation, no wheezing or crackles. 


ABDOMEN: Soft, nontender, nondistended, normoactive bowel sounds. No palpable 

organomegaly. 


MUSCULOSKELETAL: No joint swelling or deformity.


EXTREMITIES: No cyanosis, clubbing, or pedal edema. 


NEUROLOGICAL: Gross neurological examination did not reveal any focal deficits. 


SKIN: No rashes. 








- Labs


CBC & Chem 7: 


                                 02/04/21 08:13





                                 02/04/21 08:13


Labs: 


                  Abnormal Lab Results - Last 24 Hours (Table)











  02/03/21 02/03/21 02/03/21 Range/Units





  07:56 11:50 17:08 


 


Glucose     (74-99)  mg/dL


 


POC Glucose (mg/dL)   255 H  353 H  (75-99)  mg/dL


 


Procalcitonin  1.61 H    (0.02-0.09)  ng/mL














  02/03/21 02/04/21 02/04/21 Range/Units





  20:14 07:33 08:13 


 


Glucose    269 H  (74-99)  mg/dL


 


POC Glucose (mg/dL)  378 H  320 H   (75-99)  mg/dL


 


Procalcitonin     (0.02-0.09)  ng/mL








                      Microbiology - Last 24 Hours (Table)











 02/03/21 06:31 Gram Stain - Preliminary





 Sputum Sputum Culture - Preliminary


 


 02/02/21 12:42 Blood Culture Gram Stain - Preliminary





 Blood 


 


 02/02/21 12:42 Blood Culture Gram Stain - Preliminary





 Blood Blood Culture - Preliminary





    Streptococcus species


 


 02/02/21 12:42 Blood Culture - Final





 Blood 


 


 02/02/21 12:42 Blood Culture - Final





 Blood 














Assessment and Plan


Plan: 


-Diabetic ketoacidosis patient has type 2 diabetes mellitus : Ketoacidosis 

resolved patient the blood sugars although still not well controlled will 

increase the dose of long-acting as well as prenatal insulin


-Chest pain, ruled out for pulmonary embolism.  Possibility of non-ST elevation 

myocardial infarction patient had had a cardiac catheterization and cardiac cath

report as mentioned above.


-Bacteremia with the strep species repeat blood cultures and without and patient

was started on Rocephin infectious disease will be consulted patient may have 

pneumococcal pneumonia.  Repeat Covid 19 was negative as well.  Patient probably

has bacterial pneumonia which contributed to bacteremia.  Patient is presently 

on Rocephin and vancomycin.  Vancomycin can be discontinued after finalization 

of the cultures


- history of cardiomyopathy previous to be ischemic cardiomyopathy severe 

systolic dysfunction doesn't appear to be in acute exacerbation, disc any IV 

fluids repeat chest x-ray did not show any pulmonary edema


-Coronary artery disease


-Type 2 diabetes mellitus uncontrolled elevated blood sugars secondary to 

noncompliance


-Hyperlipidemia


-Hypertension: Patient will be resumed on lisinopril discontinue amlodipine


-Acute renal failure probably secondary to intravascular depletion from DKA 

improved creatinine.  Unsure whether patient has chronic kidney disease from 

diabetes


-DVT prophylaxis with subcutaneous Lovenox GI prophylaxis with Protonix

## 2021-02-04 NOTE — PN
PROGRESS NOTE



DATE OF SERVICE:

02/04/2021



REASON FOR FOLLOWUP:

Bacteremia, possible pneumonia.



INTERVAL HISTORY:

The patient is currently afebrile, has been complaining of some chest pain though.

Denies having any worsening shortness or breath. Minimal cough.  No nausea.  No

vomiting. No abdominal pain or diarrhea.



PHYSICAL EXAMINATION:

Blood pressure 122/68 with pulse 76, temperature 98.6.  He is 96% on room air.

General description is a middle-aged male lying in bed in no distress.

RESPIRATORY SYSTEM: Unlabored breathing, clear to auscultation anteriorly.

HEART: S1, S2.  Regular rate and rhythm.

ABDOMEN: Soft, no tenderness.



LABS:

Creatinine 1.09.  Blood culture Streptococcus species. Sputum culture currently

pending.



DIAGNOSTIC IMPRESSION AND PLAN:

Patient with a positive blood culture with streptococcal likely question of possible

pneumonia; however, chest x-ray now reported negative for any acute pulmonary process.

Will wait for the final ID of this pathogen to determine need for further workup.

Patient is covered with Rocephin and vancomycin. Blood culture repeat has been ordered.

Continue with supportive care.





MMODL / IJN: [id number] / Job#: [id number]

## 2021-02-04 NOTE — CONS
CONSULTATION



DATE OF CONSULTATION:

02/03/2021.



REASON FOR CONSULTATION:

Bacteremia.



HISTORY OF PRESENT ILLNESS:

The patient is a 55-year-old  man presented to the ER yesterday morning

for evaluation of chest discomfort.  The patient's symptoms started before presentation

to the hospital, has been mostly central.  With symptoms getting worse, he presented to

hospital.  Described the pain to be more sharp in nature 5 to 6 out of 10 and no

radiation. The patient also complained of shortness of breath.  He did have a cough

which has been moderate intensity and apparently told the ER physician he had an

episode of hemoptysis.  With these symptoms, the patient was evaluated by the ER

physician.  On arrival to the ER, the patient did have a low-grade fever of 99.1

degrees Fahrenheit.  The patient did have a white count of 17.4.  Kidney function was

normal.  Urine was negative. [place]  PCR was negative x2.  The patient did have a chest

x-ray with pulmonary venous congestion without overt failure.  No evidence of

infiltrate.  The patient did have a CT angiogram of the chest which had nonspecific

ground glass opacity bilaterally suspicious for atypical pneumonia.  No PE.  The

patient did have blood cultures drawn which is now coming positive gram-positive cocci

resembling strep.  He has been on Rocephin.  Infectious Disease was consulted for

further management of antibiotic therapy.



REVIEW OF SYSTEMS:

Positive points have been mentioned in HPI.  Rest of systems are negative.



PAST MEDICAL HISTORY:

Significant for coronary artery disease, heart failure, diabetes mellitus,

hypertension, apparently previous history of MRSA infection on the left index finger.



PAST SURGICAL HISTORY:

Cardiac cath.



SOCIAL HISTORY:

Current everyday smoker.  Did admit to marijuana use.



FAMILY HISTORY:

Brother history of heart disease.



ALLERGIES:

No known drug allergies.



MEDICATIONS:

The patient is currently on Xanax, Norvasc, aspirin, Lipitor, Rocephin 2 grams daily.

He is on heparin per protocol and Lopressor.



PHYSICAL EXAMINATION:

Blood pressure 140/83 with a pulse of 83, temperature 98.8, T-max 99.1.  He is 97% on

room air.

General description: The patient is a middle-aged male lying in bed in no distress.  No

tachypnea or accessory muscles of respiration use.

HEENT:  Examination shows no pallor or scleral icterus.  Oral mucous membranes dry.  No

pharyngeal erythema or thrush.

Neck trachea central.  No thyromegaly.

Lungs: Unlabored breathing, decreased intensity of breath sounds.  No wheeze.

HEART: S1, S2.  Regular rate and rhythm.

ABDOMEN:  Soft, no tenderness.  No guarding. No rigidity.

EXTREMITIES:  No edema of the feet.

SKIN examination:  No rash or mass palpable.  NEUROLOGICAL: The patient is awake,

alert, oriented x3.  Mood and affect normal.



LABS:

Hemoglobin is 13.5, white count 17.4, BUN of 27, creatinine 1.25. Urine is negative.

[place] PCR is negative.  Blood culture 02/02 positive for strep.



DIAGNOSTIC IMPRESSION AND PLAN:

Patient admitted to the hospital with shortness of breath, chest pain, cough,

hemoptysis, now with evidence of positive blood culture with Streptococcus and a

question of possible streptococcal pneumonia, bacteremia and possible source pneumonia.



PLAN:

1. Blood culture repeat to rule out gram negative  bacteremia.

2. Rocephin.  However, add vancomycin pharmacy to dose while waiting for ID

    sensitivity of this pathogen.

3. We will follow on his clinical condition and culture to further adjust medication

    if needed.

Thank you for this consultation.  Will follow this patient along with you.





MMODL / IJN: [id number] / Job#: [id number]

## 2021-02-04 NOTE — P.PN
Subjective


Principal diagnosis: 





Patient is resting comfortably in bed.  He is also ambulating around the room.  

He denies any chest discomfort no shortness of breath no dizziness 

lightheadedness


Blood pressure 125/75 mmHg pulse rate in the 70s afebrile


Normal heart sounds normal S1 normal S2


Breath sounds are clear no rhonchi no crackles


Abdomen soft


Extended is warm no edema





Impression


Coronary artery disease


He has an occluded RCA and circumflex which is chronic and of long-standing


This was known in the past


His LAD is patent and does not show any significant stenosis





Impression


Uncontrolled hypertension


Noncompliance with medical treatment


Abnormal cardiac enzymes, non-Q-wave myocardial infarction of uncertain 

significance but possibly secondary to small vessel disease


No evidence for pulmonary embolism but possible atypical pneumonia suggested on 

computed tomography scan.  The patient looks 6 fairly comfortable





Suggest


Continue his current cardiac medications and follow with [facility] 

[facility]





Objective





- Vital Signs


Vital signs: 


                                   Vital Signs











Temp  98.6 F   02/04/21 08:00


 


Pulse  70   02/04/21 12:00


 


Resp  16   02/04/21 14:00


 


BP  125/75   02/04/21 12:00


 


Pulse Ox  96   02/04/21 12:00








                                 Intake & Output











 02/03/21 02/04/21 02/04/21





 18:59 06:59 18:59


 


Intake Total 461  480


 


Balance 461  480


 


Weight  101.3 kg 


 


Intake:   


 


    


 


  Oral 361  480


 


Other:   


 


  Voiding Method Toilet Toilet Toilet














- Labs


CBC & Chem 7: 


                                 02/04/21 08:13





                                 02/04/21 08:13


Labs: 


                  Abnormal Lab Results - Last 24 Hours (Table)











  02/03/21 02/03/21 02/03/21 Range/Units





  07:56 17:08 20:14 


 


Glucose     (74-99)  mg/dL


 


POC Glucose (mg/dL)   353 H  378 H  (75-99)  mg/dL


 


Procalcitonin  1.61 H    (0.02-0.09)  ng/mL














  02/04/21 02/04/21 02/04/21 Range/Units





  07:33 08:13 12:08 


 


Glucose   269 H   (74-99)  mg/dL


 


POC Glucose (mg/dL)  320 H   237 H  (75-99)  mg/dL


 


Procalcitonin     (0.02-0.09)  ng/mL








                      Microbiology - Last 24 Hours (Table)











 02/03/21 12:24 Blood Culture - Preliminary





 Blood    No Growth after 24 hours


 


 02/03/21 06:31 Gram Stain - Preliminary





 Sputum Sputum Culture - Preliminary


 


 02/02/21 12:42 Blood Culture Gram Stain - Preliminary





 Blood 


 


 02/02/21 12:42 Blood Culture Gram Stain - Preliminary





 Blood Blood Culture - Preliminary





    Streptococcus species


 


 02/02/21 12:42 Blood Culture - Final





 Blood 


 


 02/02/21 12:42 Blood Culture - Final





 Blood

## 2021-02-05 LAB
ANION GAP SERPL CALC-SCNC: 4 MMOL/L
BUN SERPL-SCNC: 17 MG/DL (ref 9–20)
CALCIUM SPEC-MCNC: 8.8 MG/DL (ref 8.4–10.2)
CHLORIDE SERPL-SCNC: 106 MMOL/L (ref 98–107)
CO2 SERPL-SCNC: 24 MMOL/L (ref 22–30)
ERYTHROCYTE [DISTWIDTH] IN BLOOD BY AUTOMATED COUNT: 4.08 M/UL (ref 4.3–5.9)
ERYTHROCYTE [DISTWIDTH] IN BLOOD: 14.3 % (ref 11.5–15.5)
GLUCOSE BLD-MCNC: 188 MG/DL (ref 75–99)
GLUCOSE BLD-MCNC: 228 MG/DL (ref 75–99)
GLUCOSE BLD-MCNC: 247 MG/DL (ref 75–99)
GLUCOSE BLD-MCNC: 280 MG/DL (ref 75–99)
GLUCOSE SERPL-MCNC: 192 MG/DL (ref 74–99)
HCT VFR BLD AUTO: 35.1 % (ref 39–53)
HGB BLD-MCNC: 11.6 GM/DL (ref 13–17.5)
MCH RBC QN AUTO: 28.5 PG (ref 25–35)
MCHC RBC AUTO-ENTMCNC: 33.1 G/DL (ref 31–37)
MCV RBC AUTO: 86 FL (ref 80–100)
PLATELET # BLD AUTO: 157 K/UL (ref 150–450)
POTASSIUM SERPL-SCNC: 4.2 MMOL/L (ref 3.5–5.1)
SODIUM SERPL-SCNC: 134 MMOL/L (ref 137–145)
WBC # BLD AUTO: 11.1 K/UL (ref 3.8–10.6)

## 2021-02-05 RX ADMIN — ENOXAPARIN SODIUM SCH MG: 40 INJECTION SUBCUTANEOUS at 09:40

## 2021-02-05 RX ADMIN — INSULIN ASPART SCH UNIT: 100 INJECTION, SOLUTION INTRAVENOUS; SUBCUTANEOUS at 12:16

## 2021-02-05 RX ADMIN — INSULIN ASPART SCH UNIT: 100 INJECTION, SOLUTION INTRAVENOUS; SUBCUTANEOUS at 07:13

## 2021-02-05 RX ADMIN — METOPROLOL TARTRATE SCH MG: 25 TABLET, FILM COATED ORAL at 21:04

## 2021-02-05 RX ADMIN — ASPIRIN 81 MG CHEWABLE TABLET SCH MG: 81 TABLET CHEWABLE at 09:39

## 2021-02-05 RX ADMIN — METOPROLOL TARTRATE SCH MG: 25 TABLET, FILM COATED ORAL at 09:40

## 2021-02-05 RX ADMIN — ATORVASTATIN CALCIUM SCH MG: 80 TABLET, FILM COATED ORAL at 09:39

## 2021-02-05 RX ADMIN — INSULIN ASPART SCH UNIT: 100 INJECTION, SOLUTION INTRAVENOUS; SUBCUTANEOUS at 18:22

## 2021-02-05 RX ADMIN — PANTOPRAZOLE SODIUM SCH MG: 40 TABLET, DELAYED RELEASE ORAL at 07:06

## 2021-02-05 RX ADMIN — INSULIN ASPART SCH UNIT: 100 INJECTION, SOLUTION INTRAVENOUS; SUBCUTANEOUS at 12:17

## 2021-02-05 RX ADMIN — SODIUM CHLORIDE SCH MLS/HR: 9 INJECTION, SOLUTION INTRAVENOUS at 07:06

## 2021-02-05 RX ADMIN — ISOSORBIDE MONONITRATE SCH MG: 30 TABLET, EXTENDED RELEASE ORAL at 09:39

## 2021-02-05 RX ADMIN — INSULIN ASPART SCH UNIT: 100 INJECTION, SOLUTION INTRAVENOUS; SUBCUTANEOUS at 21:06

## 2021-02-05 RX ADMIN — INSULIN DETEMIR SCH UNIT: 100 INJECTION, SOLUTION SUBCUTANEOUS at 21:04

## 2021-02-05 NOTE — P.PN
Subjective


Progress Note Date: 02/05/21


Principal diagnosis: 





Chest pain





55-year-old black male who presents to the emergency department on February 2, 

at 8:52 in the morning, with complaints of chest discomfort.  The patient states

that his symptoms began the day prior to admission but relatively mild and they 

got worse as a night went on and into the early morning.  When he presented to 

the emergency department, his chest pain was mild again.  Apparently he told the

ER physician was difficult to describe.  There is no radiation.  The patient did

apparently have some mild shortness of breath as well.  He also apparently had 

an episode of hemoptysis.  In addition, the patient was apparently having a very

harsh cough.  Pain increased with deep breathing.  There is no nausea or 

diaphoresis.  There is no fever.  There was no leg pain or leg swelling.  The 

patient underwent cardiac catheterization today, and the final impression of the

cardiologist was that the patient had stable coronary disease, with a total 

occlusion of the circumflex and right coronary arteries, and an LAD which is 

free of any significant occlusive disease which provides collaterals to the RCA.

 The recommendation was that of risk factor modification, and maximal medical 

therapy.  The patient has a history of CAD, angina, heart failure, diabetes, 

essential hypertension, and sciatica, among other things.  The patient does have

a history of substantial chronic tobacco use, and could have underlying COPD.  

He also has used marijuana in the past.  He has never seen a pulmonologist in 

the past.





The patient is seen today 02/04/2021 in follow-up on the selective care unit.  

He is currently awake and alert in no acute distress.  He is still having 

ongoing issues with chest pain.  Cardiac catheterization revealed coronary 

artery disease with a total occlusion of the circumflex and right coronary 

artery.  LAD was free of any significant occlusive disease which provides 

collaterals to the RCA.  The plan is to treat medically.  Chest x-ray reveals 

evidence of cardiomegaly but no acute pulmonary process.  Blood culture 

revealing streptococcus species.  Currently on vancomycin.  Sodium 137.  

Potassium 4.3.  Creatinine 1.09.  Glucose 269.  Remains on ceftriaxone.





The patient is seen today 02/05/2021 in follow-up on the selective care unit.  

He is currently sitting up in a chair at the bedside.  Awake and alert in no 

acute distress.  He is maintaining O2 saturations in the mid 90s on room air.  

He's afebrile.  Hemodynamically stable.  Initial blood cultures were positive 

for alpha hemolytic streptococcus.  Follow-up blood cultures revealing no growth

to date.  White count 11.1.  Hemoglobin 11.6.  Sodium 134.  Potassium 4.2.  

Creatinine 0.99.  He is currently on vancomycin and ceftriaxone.





Objective





- Vital Signs


Vital signs: 


                                   Vital Signs











Temp  98.7 F   02/05/21 08:00


 


Pulse  70   02/05/21 08:00


 


Resp  18   02/05/21 08:00


 


BP  139/68   02/05/21 08:00


 


Pulse Ox  96   02/05/21 08:00








                                 Intake & Output











 02/04/21 02/05/21 02/05/21





 18:59 06:59 18:59


 


Intake Total 720 475 240


 


Balance 720 475 240


 


Weight  105.5 kg 


 


Intake:   


 


  Oral 720 475 240


 


Other:   


 


  Voiding Method Toilet Toilet Toilet


 


  # Voids  1 














- Exam





GENERAL EXAM: Alert, pleasant 55-year-old male patient, on room air, in no 

apparent distress.


HEAD: Normocephalic.


EYES: Normal reaction of pupils, equal size.


NOSE: Clear with pink turbinates.


THROAT: No erythema or exudates.


NECK: No masses, no JVD.


CHEST: No chest wall deformity.


LUNGS: Equal air entry with no crackles, wheeze, rhonchi or dullness.


CVS: S1 and S2 normal with no audible murmur, regular rhythm.


ABDOMEN: No hepatosplenomegaly, normal bowel sounds, no guarding or rigidity.


SPINE: No scoliosis or deformity


SKIN: No rashes


CENTRAL NERVOUS SYSTEM: No focal deficits, tone is normal in all 4 extremities.


EXTREMITIES: There is no peripheral edema.  No clubbing, no cyanosis.  

Peripheral pulses are intact.





- Labs


CBC & Chem 7: 


                                 02/05/21 06:48





                                 02/05/21 06:48


Labs: 


                  Abnormal Lab Results - Last 24 Hours (Table)











  02/04/21 02/04/21 02/05/21 Range/Units





  17:13 20:49 06:48 


 


WBC    11.1 H  (3.8-10.6)  k/uL


 


RBC    4.08 L  (4.30-5.90)  m/uL


 


Hgb    11.6 L  (13.0-17.5)  gm/dL


 


Hct    35.1 L  (39.0-53.0)  %


 


Sodium     (137-145)  mmol/L


 


Glucose     (74-99)  mg/dL


 


POC Glucose (mg/dL)  357 H  154 H   (75-99)  mg/dL














  02/05/21 02/05/21 02/05/21 Range/Units





  06:48 07:06 12:00 


 


WBC     (3.8-10.6)  k/uL


 


RBC     (4.30-5.90)  m/uL


 


Hgb     (13.0-17.5)  gm/dL


 


Hct     (39.0-53.0)  %


 


Sodium  134 L    (137-145)  mmol/L


 


Glucose  192 H    (74-99)  mg/dL


 


POC Glucose (mg/dL)   280 H  247 H  (75-99)  mg/dL








                      Microbiology - Last 24 Hours (Table)











 02/04/21 08:13 Blood Culture - Preliminary





 Blood    No Growth after 24 hours


 


 02/03/21 06:31 Gram Stain - Final





 Sputum Sputum Culture - Final


 


 02/02/21 12:42 Blood Culture Gram Stain - Preliminary





 Blood Blood Culture - Preliminary





    Alpha Hemolytic Streptococcus


 


 02/02/21 12:42 Blood Culture Gram Stain - Preliminary





 Blood Blood Culture - Preliminary





    Alpha Hemolytic Streptococcus


 


 02/03/21 12:24 Blood Culture - Preliminary





 Blood    No Growth after 24 hours














Assessment and Plan


Assessment: 





1 Acute chest pain, status post cardiac catheterization, with stable coronary 

disease, with total occlusion of the circumflex and right coronary artery, and 

an LAD, which is free of any significant occlusive disease and provides collate

rals to the right coronary artery.





2 Shortness of breath, with a pleuritic component, and a CT angiogram, that 

suggest atypical pneumonia/coronavirus infection even though the patient's test 

was negative.  This could represent a viral pneumonia with viral pleurisy.





3 Probable underlying COPD from previous heavy tobacco use.





4 Blood cultures with evidence of gram-positive cocci in chains, from 0

2/02/2021.





5 Ongoing tobacco use with nicotine addiction.





6 History of chronic marijuana use.





7 History of coronary artery disease.





8 History of hyperlipidemia.





9 History of diabetes mellitus.





10 History of hypertension.





11 History of congestive heart failure.





12 History of angina pectoris.





13 Prior history of MRSA infection.





Plan:





The patient was seen and evaluated by Dr. Grajeda


Suspected pleuritic-type chest discomfort


Blood cultures with Streptococcus species


Currently on vancomycin and ceftriaxone


Follow-up blood cultures revealing no growth to date





I, the cosigning physician, performed a history & physical examination of the 

patient. Lungs sounds are clear.  Maintaining good O2 saturations in the 90s on 

room air.  I discussed the assessment and plan of care with my nurse 

practitioner, Sabrina White. I attest to the above note as dictated by her.

## 2021-02-05 NOTE — P.PN
Subjective


Progress Note Date: 02/05/21


Principal diagnosis: 


Chest pain





02/05/2021 


patient seen in follow-up on the selective care unit.  He is currently sitting 

up in a chair at the bedside.  Awake and alert in no acute distress.  He is 

maintaining O2 saturations in the mid 90s on room air.  He's afebrile.  

Hemodynamically stable.  Initial blood cultures were positive for alpha 

hemolytic streptococcus.  Follow-up blood cultures revealing no growth to date. 

White count 11.1.  Hemoglobin 11.6.  Sodium 134.  Potassium 4.2.  Creatinine 

0.99.  He is currently on vancomycin and ceftriaxone.








Objective





- Vital Signs


Vital signs: 


                                   Vital Signs











Temp  98.7 F   02/05/21 08:00


 


Pulse  70   02/05/21 08:00


 


Resp  18   02/05/21 08:00


 


BP  139/68   02/05/21 08:00


 


Pulse Ox  96   02/05/21 08:00








                                 Intake & Output











 02/04/21 02/05/21 02/05/21





 18:59 06:59 18:59


 


Intake Total 720 475 240


 


Balance 720 475 240


 


Weight  105.5 kg 


 


Intake:   


 


  Oral 720 475 240


 


Other:   


 


  Voiding Method Toilet Toilet Toilet


 


  # Voids  1 














- Exam





GENERAL EXAM: Alert, pleasant 55-year-old male patient, on room air, in no ap

parent distress.


HEAD: Normocephalic.


EYES: Normal reaction of pupils, equal size.


NOSE: Clear with pink turbinates.


THROAT: No erythema or exudates.


NECK: No masses, no JVD.


CHEST: No chest wall deformity.


LUNGS: Equal air entry with no crackles, wheeze, rhonchi or dullness.


CVS: S1 and S2 normal with no audible murmur, regular rhythm.


ABDOMEN: No hepatosplenomegaly, normal bowel sounds, no guarding or rigidity.


CENTRAL NERVOUS SYSTEM: No focal deficits, tone is normal in all 4 extremities.


EXTREMITIES: There is no peripheral edema.  No clubbing, no cyanosis.  

Peripheral pulses are intact.





- Labs


CBC & Chem 7: 


                                 02/05/21 06:48





                                 02/05/21 06:48


Labs: 


                  Abnormal Lab Results - Last 24 Hours (Table)











  02/04/21 02/04/21 02/04/21 Range/Units





  12:08 17:13 20:49 


 


WBC     (3.8-10.6)  k/uL


 


RBC     (4.30-5.90)  m/uL


 


Hgb     (13.0-17.5)  gm/dL


 


Hct     (39.0-53.0)  %


 


Sodium     (137-145)  mmol/L


 


Glucose     (74-99)  mg/dL


 


POC Glucose (mg/dL)  237 H  357 H  154 H  (75-99)  mg/dL














  02/05/21 02/05/21 02/05/21 Range/Units





  06:48 06:48 07:06 


 


WBC  11.1 H    (3.8-10.6)  k/uL


 


RBC  4.08 L    (4.30-5.90)  m/uL


 


Hgb  11.6 L    (13.0-17.5)  gm/dL


 


Hct  35.1 L    (39.0-53.0)  %


 


Sodium   134 L   (137-145)  mmol/L


 


Glucose   192 H   (74-99)  mg/dL


 


POC Glucose (mg/dL)    280 H  (75-99)  mg/dL








                      Microbiology - Last 24 Hours (Table)











 02/04/21 08:13 Blood Culture - Preliminary





 Blood    No Growth after 24 hours


 


 02/03/21 06:31 Gram Stain - Final





 Sputum Sputum Culture - Final


 


 02/02/21 12:42 Blood Culture Gram Stain - Preliminary





 Blood Blood Culture - Preliminary





    Alpha Hemolytic Streptococcus


 


 02/02/21 12:42 Blood Culture Gram Stain - Preliminary





 Blood Blood Culture - Preliminary





    Alpha Hemolytic Streptococcus


 


 02/03/21 12:24 Blood Culture - Preliminary





 Blood    No Growth after 24 hours














Assessment and Plan


Assessment: 


-Diabetic ketoacidosis patient has type 2 diabetes mellitus : Ketoacidosis 

resolved patient the blood sugars although still not well controlled will 

increase the dose of long-acting as well as prenatal insulin


-Chest pain, ruled out for pulmonary embolism.  Possibility of non-ST elevation 

myocardial infarction patient had had a cardiac catheterization and cardiac cath

report as mentioned above.


-Bacteremia with the strep species repeat blood cultures and without and patient

was started on Rocephin infectious disease will be consulted patient may have 

pneumococcal pneumonia.  Repeat Covid 19 was negative as well.  Patient probably

has bacterial pneumonia which contributed to bacteremia.  Patient is presently 

on Rocephin and vancomycin.  Vancomycin can be discontinued after finalization 

of the cultures


- history of cardiomyopathy previous to be ischemic cardiomyopathy severe 

systolic dysfunction doesn't appear to be in acute exacerbation, disc any IV 

fluids repeat chest x-ray did not show any pulmonary edema


-Coronary artery disease


-Type 2 diabetes mellitus uncontrolled elevated blood sugars secondary to 

noncompliance


-Hyperlipidemia


-Hypertension: Patient will be resumed on lisinopril discontinue amlodipine


-Acute renal failure probably secondary to intravascular depletion from DKA 

improved creatinine.  Unsure whether patient has chronic kidney disease from di

abetes


-DVT prophylaxis with subcutaneous Lovenox GI prophylaxis with Protonix

## 2021-02-05 NOTE — CT
EXAMINATION TYPE: CT abdomen pelvis w con

 

DATE OF EXAM: 2/5/2021

 

COMPARISON: None

 

HISTORY: Bacteremia.

 

CT DLP: 1535.9 mGycm

Automated exposure control for dose reduction was used.

 

CONTRAST: 

Performed with IV Contrast, patient injected with 100 mL of Isovue 300.

 

Images obtained from the diaphragm to the floor the pelvis with oral and intravenous contrast.

 

Lung bases are clear of consolidation. There is mild subsegmental atelectasis left lung base. Heart s
ize is normal. There is no pericardial effusion.

 

Liver spleen stomach pancreas appear intact. Gallbladder is contracted. Bile ducts are not dilated.

 

There is no adrenal mass. Kidneys show satisfactory contrast opacification. There is no hydronephrosi
s. There are small bilateral renal cortical cysts that measure less than 1 cm. Delayed images show no
rmal renal excretion. Ureters are not dilated. There is no retroperitoneal adenopathy. Appendix is po
sterior and appears normal. Bladder distends smoothly. There is left hip prosthesis. There is no ingu
inal hernia. There is no evidence of a pelvic mass. There is no free fluid in the pelvis.

 

Lumbar vertebra have normal alignment. Posterior elements are intact. Disc spaces are fairly normal. 
The bony pelvis is intact.

 

There is no mesenteric edema. There is no ascites or free air. There is no bowel obstruction. Small b
owel pattern is normal.

 

IMPRESSION:

Mild subsegmental atelectasis left lung base. No acute abnormality within the abdomen pelvis. Mild at
herosclerotic vascular disease.

## 2021-02-06 LAB
GLUCOSE BLD-MCNC: 132 MG/DL (ref 75–99)
GLUCOSE BLD-MCNC: 136 MG/DL (ref 75–99)
GLUCOSE BLD-MCNC: 208 MG/DL (ref 75–99)
GLUCOSE BLD-MCNC: 246 MG/DL (ref 75–99)

## 2021-02-06 RX ADMIN — INSULIN ASPART SCH UNIT: 100 INJECTION, SOLUTION INTRAVENOUS; SUBCUTANEOUS at 17:26

## 2021-02-06 RX ADMIN — ATORVASTATIN CALCIUM SCH MG: 80 TABLET, FILM COATED ORAL at 09:31

## 2021-02-06 RX ADMIN — INSULIN ASPART SCH UNIT: 100 INJECTION, SOLUTION INTRAVENOUS; SUBCUTANEOUS at 20:37

## 2021-02-06 RX ADMIN — ENOXAPARIN SODIUM SCH MG: 40 INJECTION SUBCUTANEOUS at 09:31

## 2021-02-06 RX ADMIN — METOPROLOL TARTRATE SCH MG: 25 TABLET, FILM COATED ORAL at 09:31

## 2021-02-06 RX ADMIN — INSULIN ASPART SCH UNIT: 100 INJECTION, SOLUTION INTRAVENOUS; SUBCUTANEOUS at 12:54

## 2021-02-06 RX ADMIN — ASPIRIN 81 MG CHEWABLE TABLET SCH MG: 81 TABLET CHEWABLE at 09:30

## 2021-02-06 RX ADMIN — METOPROLOL TARTRATE SCH MG: 25 TABLET, FILM COATED ORAL at 20:37

## 2021-02-06 RX ADMIN — INSULIN DETEMIR SCH UNIT: 100 INJECTION, SOLUTION SUBCUTANEOUS at 20:37

## 2021-02-06 RX ADMIN — PANTOPRAZOLE SODIUM SCH MG: 40 TABLET, DELAYED RELEASE ORAL at 06:35

## 2021-02-06 RX ADMIN — ISOSORBIDE MONONITRATE SCH MG: 30 TABLET, EXTENDED RELEASE ORAL at 09:31

## 2021-02-06 RX ADMIN — INSULIN ASPART SCH UNIT: 100 INJECTION, SOLUTION INTRAVENOUS; SUBCUTANEOUS at 06:35

## 2021-02-06 NOTE — P.PN
Subjective


Progress Note Date: 02/06/21


Principal diagnosis: 





Chest pain





55-year-old black male who presents to the emergency department on February 2, 

at 8:52 in the morning, with complaints of chest discomfort.  The patient states

that his symptoms began the day prior to admission but relatively mild and they 

got worse as a night went on and into the early morning.  When he presented to 

the emergency department, his chest pain was mild again.  Apparently he told the

ER physician was difficult to describe.  There is no radiation.  The patient did

apparently have some mild shortness of breath as well.  He also apparently had 

an episode of hemoptysis.  In addition, the patient was apparently having a very

harsh cough.  Pain increased with deep breathing.  There is no nausea or 

diaphoresis.  There is no fever.  There was no leg pain or leg swelling.  The 

patient underwent cardiac catheterization today, and the final impression of the

cardiologist was that the patient had stable coronary disease, with a total 

occlusion of the circumflex and right coronary arteries, and an LAD which is 

free of any significant occlusive disease which provides collaterals to the RCA.

 The recommendation was that of risk factor modification, and maximal medical 

therapy.  The patient has a history of CAD, angina, heart failure, diabetes, 

essential hypertension, and sciatica, among other things.  The patient does have

a history of substantial chronic tobacco use, and could have underlying COPD.  

He also has used marijuana in the past.  He has never seen a pulmonologist in 

the past.





The patient is seen today 02/04/2021 in follow-up on the selective care unit.  

He is currently awake and alert in no acute distress.  He is still having 

ongoing issues with chest pain.  Cardiac catheterization revealed coronary 

artery disease with a total occlusion of the circumflex and right coronary 

artery.  LAD was free of any significant occlusive disease which provides 

collaterals to the RCA.  The plan is to treat medically.  Chest x-ray reveals 

evidence of cardiomegaly but no acute pulmonary process.  Blood culture 

revealing streptococcus species.  Currently on vancomycin.  Sodium 137.  

Potassium 4.3.  Creatinine 1.09.  Glucose 269.  Remains on ceftriaxone.





The patient is seen today 02/05/2021 in follow-up on the selective care unit.  

He is currently sitting up in a chair at the bedside.  Awake and alert in no 

acute distress.  He is maintaining O2 saturations in the mid 90s on room air.  

He's afebrile.  Hemodynamically stable.  Initial blood cultures were positive 

for alpha hemolytic streptococcus.  Follow-up blood cultures revealing no growth

to date.  White count 11.1.  Hemoglobin 11.6.  Sodium 134.  Potassium 4.2.  

Creatinine 0.99.  He is currently on vancomycin and ceftriaxone.





The patient is seen today 02/06/2021 in follow-up on the selective care unit.  

He is currently resting comfortably in bed.  Awake and alert in no acute 

distress.  He is maintaining O2 saturations in the 90s on room air.  He's a

febrile.  CAT scan of the abdomen and pelvis revealed no abnormalities.  Mild 

atelectatic lung changes.  Follow-up blood cultures revealed no growth.  Blood 

glucose 246.  He remains on ceftriaxone.





Objective





- Vital Signs


Vital signs: 


                                   Vital Signs











Temp  98.6 F   02/06/21 08:00


 


Pulse  72   02/06/21 08:00


 


Resp  18   02/06/21 08:00


 


BP  136/79   02/06/21 08:00


 


Pulse Ox  93 L  02/06/21 08:00








                                 Intake & Output











 02/05/21 02/06/21 02/06/21





 18:59 06:59 18:59


 


Intake Total 720  236


 


Balance 720  236


 


Weight 105.5 kg 105 kg 


 


Intake:   


 


  Oral 720  236


 


Other:   


 


  Voiding Method Toilet Toilet Toilet














- Exam





GENERAL EXAM: Alert, pleasant 55-year-old male patient, on room air, in no 

apparent distress.


HEAD: Normocephalic.


EYES: Normal reaction of pupils, equal size.


NOSE: Clear with pink turbinates.


THROAT: No erythema or exudates.


NECK: No masses, no JVD.


CHEST: No chest wall deformity.


LUNGS: Equal air entry with no crackles, wheeze, rhonchi or dullness.


CVS: S1 and S2 normal with no audible murmur, regular rhythm.


ABDOMEN: No hepatosplenomegaly, normal bowel sounds, no guarding or rigidity.


SPINE: No scoliosis or deformity


SKIN: No rashes


CENTRAL NERVOUS SYSTEM: No focal deficits, tone is normal in all 4 extremities.


EXTREMITIES: There is no peripheral edema.  No clubbing, no cyanosis.  

Peripheral pulses are intact.





- Labs


CBC & Chem 7: 


                                 02/05/21 06:48





                                 02/05/21 06:48


Labs: 


                  Abnormal Lab Results - Last 24 Hours (Table)











  02/05/21 02/05/21 02/06/21 Range/Units





  17:03 21:04 06:31 


 


POC Glucose (mg/dL)  188 H  228 H  208 H  (75-99)  mg/dL














  02/06/21 Range/Units





  11:55 


 


POC Glucose (mg/dL)  246 H  (75-99)  mg/dL








                      Microbiology - Last 24 Hours (Table)











 02/04/21 08:13 Blood Culture - Preliminary





 Blood    No Growth after 48 hours


 


 02/05/21 06:48 Blood Culture - Preliminary





 Blood    No Growth after 24 hours


 


 02/02/21 12:42 Blood Culture Gram Stain - Final





 Blood Blood Culture - Final





    Alpha Hemolytic Streptococcus


 


 02/02/21 12:42 Blood Culture Gram Stain - Final





 Blood Blood Culture - Final





    Alpha Hemolytic Streptococcus


 


 02/03/21 12:24 Blood Culture - Preliminary





 Blood    No Growth after 48 hours


 


 02/03/21 06:31 Gram Stain - Final





 Sputum Sputum Culture - Final














Assessment and Plan


Assessment: 





1 Acute chest pain, status post cardiac catheterization, with stable coronary di

sease, with total occlusion of the circumflex and right coronary artery, and an 

LAD, which is free of any significant occlusive disease and provides collaterals

to the right coronary artery.





2 Shortness of breath, with a pleuritic component, and a CT angiogram, that 

suggest atypical pneumonia/coronavirus infection even though the patient's test 

was negative.  This could represent a viral pneumonia with viral pleurisy.





3 Probable underlying COPD from previous heavy tobacco use.





4 Blood cultures with evidence of gram-positive cocci in chains, from 

02/02/2021.





5 Ongoing tobacco use with nicotine addiction.





6 History of chronic marijuana use.





7 History of coronary artery disease.





8 History of hyperlipidemia.





9 History of diabetes mellitus.





10 History of hypertension.





11 History of congestive heart failure.





12 History of angina pectoris.





13 Prior history of MRSA infection.





Plan:





The patient was seen and evaluated by Dr. Nicci Lopes from the pulmonary standpoint


We will see as needed





I, the cosigning physician, performed a history & physical examination of the 

patient. Lungs sounds are clear.  Maintaining good O2 saturations in the 90s on 

room air.  I discussed the assessment and plan of care with my nurse 

practitioner, Sabrina White. I attest to the above note as dictated by her.

## 2021-02-06 NOTE — PN
PROGRESS NOTE



DATE OF SERVICE:

02/06/2021



REASON FOR FOLLOW UP:

Streptococcal bacteremia.



INTERVAL HISTORY:

Patient is currently afebrile.  Patient is breathing comfortably.  Denies having any

chest pain.  No shortness of breath.  Minimal cough.  No nausea, no vomiting.  No

abdominal pain, no diarrhea.



PHYSICAL EXAMINATION:

Blood pressure 145/97, pulse of 68, temperature 98.6.  He is 97% on room air.  General

description is a middle-aged male lying in bed in no distress.  Respiratory system:

Unlabored breathing, clear to auscultation anteriorly.  Heart S1, S2.  Regular rate and

rhythm.  Abdomen soft, no tenderness.



LABS:

White count down to 11.1.  Blood culture repeat has been negative so far.  A CT of

abdomen and pelvis did not show any acute focus.



DIAGNOSTIC IMPRESSION AND PLAN:

Patient with Streptococcal bacteremia and admitted to hospital with sepsis, most (  )

symptoms with initial concern for pneumonia.  However, no significant consolidation was

seen on CT of the chest and abdominal and pelvis CT has been negative as well.  The

patient would benefit from a SOTO to make sure no evidence of any vegetation.  Continue

with Rocephin.  Monitor clinical course closely.





MMODL / IJN: [id number] / Job#: [id number]

## 2021-02-06 NOTE — P.PN
Subjective


Progress Note Date: 02/06/21


Principal diagnosis: 


Chest pain





02/05/2021 


patient seen in follow-up on the selective care unit.  He is currently sitting 

up in a chair at the bedside.  Awake and alert in no acute distress.  He is 

maintaining O2 saturations in the mid 90s on room air.  He's afebrile.  

Hemodynamically stable.  Initial blood cultures were positive for alpha 

hemolytic streptococcus.  Follow-up blood cultures revealing no growth to date. 

White count 11.1.  Hemoglobin 11.6.  Sodium 134.  Potassium 4.2.  Creatinine 

0.99.  He is currently on vancomycin and ceftriaxone.





02/06/2021 


Patient is seen and evaluated at bedside in follow-up on the selective care 

unit.  He is currently resting comfortably in bed.  Awake and alert in no acute 

distress.  He is maintaining O2 saturations in the 90s on room air.  He's 

afebrile.  CAT scan of the abdomen and pelvis revealed no abnormalities.  Mild 

atelectatic lung changes.  Follow-up blood cultures revealed no growth.  Blood 

glucose 246.  He remains on ceftriaxone.  Await final recommendations from ID 

for choice and duration of antibiotics once final culture and sensitivity is 

available








Objective





- Vital Signs


Vital signs: 


                                   Vital Signs











Temp  98.6 F   02/06/21 08:00


 


Pulse  72   02/06/21 08:00


 


Resp  18   02/06/21 08:00


 


BP  136/79   02/06/21 08:00


 


Pulse Ox  93 L  02/06/21 08:00








                                 Intake & Output











 02/05/21 02/06/21 02/06/21





 18:59 06:59 18:59


 


Intake Total 720  236


 


Balance 720  236


 


Weight 105.5 kg 105 kg 


 


Intake:   


 


  Oral 720  236


 


Other:   


 


  Voiding Method Toilet Toilet Toilet














- Exam





GENERAL EXAM: Alert, pleasant 55-year-old male patient, on room air, in no 

apparent distress.


HEAD: Normocephalic.


EYES: Normal reaction of pupils, equal size.


NOSE: Clear with pink turbinates.


THROAT: No erythema or exudates.


NECK: No masses, no JVD.


CHEST: No chest wall deformity.


LUNGS: Equal air entry with no crackles, wheeze, rhonchi or dullness.


CVS: S1 and S2 normal with no audible murmur, regular rhythm.


ABDOMEN: No hepatosplenomegaly, normal bowel sounds, no guarding or rigidity.


CENTRAL NERVOUS SYSTEM: No focal deficits, tone is normal in all 4 extremities.


EXTREMITIES: There is no peripheral edema.  No clubbing, no cyanosis.  

Peripheral pulses are intact.





- Labs


CBC & Chem 7: 


                                 02/05/21 06:48





                                 02/05/21 06:48


Labs: 


                  Abnormal Lab Results - Last 24 Hours (Table)











  02/05/21 02/05/21 02/06/21 Range/Units





  17:03 21:04 06:31 


 


POC Glucose (mg/dL)  188 H  228 H  208 H  (75-99)  mg/dL














  02/06/21 Range/Units





  11:55 


 


POC Glucose (mg/dL)  246 H  (75-99)  mg/dL








                      Microbiology - Last 24 Hours (Table)











 02/04/21 08:13 Blood Culture - Preliminary





 Blood    No Growth after 48 hours


 


 02/05/21 06:48 Blood Culture - Preliminary





 Blood    No Growth after 24 hours


 


 02/02/21 12:42 Blood Culture Gram Stain - Final





 Blood Blood Culture - Final





    Alpha Hemolytic Streptococcus


 


 02/02/21 12:42 Blood Culture Gram Stain - Final





 Blood Blood Culture - Final





    Alpha Hemolytic Streptococcus


 


 02/03/21 12:24 Blood Culture - Preliminary





 Blood    No Growth after 48 hours














Assessment and Plan


Assessment: 


-Diabetic ketoacidosis patient has type 2 diabetes mellitus : Ketoacidosis 

resolved patient the blood sugars although still not well controlled will 

increase the dose of long-acting as well as prenatal insulin


-Chest pain, ruled out for pulmonary embolism.  Possibility of non-ST elevation 

myocardial infarction patient had had a cardiac catheterization and cardiac cath

report as mentioned above.


-Bacteremia with the strep species repeat blood cultures and without and patient

was started on Rocephin infectious disease will be consulted patient may have 

pneumococcal pneumonia.  Repeat Covid 19 was negative as well.  Patient probably

has bacterial pneumonia which contributed to bacteremia.  Patient is presently 

on Rocephin and vancomycin.  Vancomycin can be discontinued after finalization 

of the cultures


- history of cardiomyopathy previous to be ischemic cardiomyopathy severe 

systolic dysfunction doesn't appear to be in acute exacerbation, disc any IV 

fluids repeat chest x-ray did not show any pulmonary edema


-Coronary artery disease


-Type 2 diabetes mellitus uncontrolled elevated blood sugars secondary to 

noncompliance


-Hyperlipidemia


-Hypertension: Patient will be resumed on lisinopril discontinue amlodipine


-Acute renal failure probably secondary to intravascular depletion from DKA 

improved creatinine.  Unsure whether patient has chronic kidney disease from 

diabetes


-DVT prophylaxis with subcutaneous Lovenox GI prophylaxis with Protonix

## 2021-02-07 LAB
ANION GAP SERPL CALC-SCNC: 9 MMOL/L
BASOPHILS # BLD AUTO: 0.1 K/UL (ref 0–0.2)
BASOPHILS NFR BLD AUTO: 1 %
BUN SERPL-SCNC: 16 MG/DL (ref 9–20)
CALCIUM SPEC-MCNC: 9.4 MG/DL (ref 8.4–10.2)
CHLORIDE SERPL-SCNC: 100 MMOL/L (ref 98–107)
CO2 SERPL-SCNC: 27 MMOL/L (ref 22–30)
EOSINOPHIL # BLD AUTO: 0.2 K/UL (ref 0–0.7)
EOSINOPHIL NFR BLD AUTO: 2 %
ERYTHROCYTE [DISTWIDTH] IN BLOOD BY AUTOMATED COUNT: 4.4 M/UL (ref 4.3–5.9)
ERYTHROCYTE [DISTWIDTH] IN BLOOD: 14.5 % (ref 11.5–15.5)
GLUCOSE BLD-MCNC: 116 MG/DL (ref 75–99)
GLUCOSE BLD-MCNC: 171 MG/DL (ref 75–99)
GLUCOSE BLD-MCNC: 173 MG/DL (ref 75–99)
GLUCOSE BLD-MCNC: 187 MG/DL (ref 75–99)
GLUCOSE BLD-MCNC: 224 MG/DL (ref 75–99)
GLUCOSE SERPL-MCNC: 243 MG/DL (ref 74–99)
HCT VFR BLD AUTO: 38.3 % (ref 39–53)
HGB BLD-MCNC: 12.4 GM/DL (ref 13–17.5)
LYMPHOCYTES # SPEC AUTO: 4.4 K/UL (ref 1–4.8)
LYMPHOCYTES NFR SPEC AUTO: 34 %
MCH RBC QN AUTO: 28.1 PG (ref 25–35)
MCHC RBC AUTO-ENTMCNC: 32.3 G/DL (ref 31–37)
MCV RBC AUTO: 87 FL (ref 80–100)
MONOCYTES # BLD AUTO: 1 K/UL (ref 0–1)
MONOCYTES NFR BLD AUTO: 7 %
NEUTROPHILS # BLD AUTO: 7 K/UL (ref 1.3–7.7)
NEUTROPHILS NFR BLD AUTO: 54 %
PLATELET # BLD AUTO: 195 K/UL (ref 150–450)
POTASSIUM SERPL-SCNC: 4.8 MMOL/L (ref 3.5–5.1)
SODIUM SERPL-SCNC: 136 MMOL/L (ref 137–145)
WBC # BLD AUTO: 12.9 K/UL (ref 3.8–10.6)

## 2021-02-07 RX ADMIN — INSULIN ASPART SCH UNIT: 100 INJECTION, SOLUTION INTRAVENOUS; SUBCUTANEOUS at 17:15

## 2021-02-07 RX ADMIN — INSULIN ASPART SCH UNIT: 100 INJECTION, SOLUTION INTRAVENOUS; SUBCUTANEOUS at 06:46

## 2021-02-07 RX ADMIN — INSULIN DETEMIR SCH UNIT: 100 INJECTION, SOLUTION SUBCUTANEOUS at 20:15

## 2021-02-07 RX ADMIN — INSULIN ASPART SCH UNIT: 100 INJECTION, SOLUTION INTRAVENOUS; SUBCUTANEOUS at 17:14

## 2021-02-07 RX ADMIN — ATORVASTATIN CALCIUM SCH MG: 80 TABLET, FILM COATED ORAL at 09:03

## 2021-02-07 RX ADMIN — METOPROLOL TARTRATE SCH MG: 25 TABLET, FILM COATED ORAL at 09:02

## 2021-02-07 RX ADMIN — PANTOPRAZOLE SODIUM SCH MG: 40 TABLET, DELAYED RELEASE ORAL at 06:46

## 2021-02-07 RX ADMIN — METOPROLOL TARTRATE SCH MG: 25 TABLET, FILM COATED ORAL at 20:14

## 2021-02-07 RX ADMIN — ENOXAPARIN SODIUM SCH MG: 40 INJECTION SUBCUTANEOUS at 09:03

## 2021-02-07 RX ADMIN — INSULIN ASPART SCH UNIT: 100 INJECTION, SOLUTION INTRAVENOUS; SUBCUTANEOUS at 13:36

## 2021-02-07 RX ADMIN — ISOSORBIDE MONONITRATE SCH MG: 30 TABLET, EXTENDED RELEASE ORAL at 09:03

## 2021-02-07 RX ADMIN — INSULIN ASPART SCH: 100 INJECTION, SOLUTION INTRAVENOUS; SUBCUTANEOUS at 20:03

## 2021-02-07 RX ADMIN — ASPIRIN 81 MG CHEWABLE TABLET SCH MG: 81 TABLET CHEWABLE at 09:02

## 2021-02-07 NOTE — P.PN
Subjective


Progress Note Date: 02/07/21


Principal diagnosis: 


Chest pain





02/05/2021 


patient seen in follow-up on the selective care unit.  He is currently sitting 

up in a chair at the bedside.  Awake and alert in no acute distress.  He is 

maintaining O2 saturations in the mid 90s on room air.  He's afebrile.  

Hemodynamically stable.  Initial blood cultures were positive for alpha 

hemolytic streptococcus.  Follow-up blood cultures revealing no growth to date. 

White count 11.1.  Hemoglobin 11.6.  Sodium 134.  Potassium 4.2.  Creatinine 

0.99.  He is currently on vancomycin and ceftriaxone.





02/06/2021 


Patient is seen and evaluated at bedside in follow-up on the selective care 

unit.  He is currently resting comfortably in bed.  Awake and alert in no acute 

distress.  He is maintaining O2 saturations in the 90s on room air.  He's 

afebrile.  CAT scan of the abdomen and pelvis revealed no abnormalities.  Mild 

atelectatic lung changes.  Follow-up blood cultures revealed no growth.  Blood 

glucose 246.  He remains on ceftriaxone.  Await final recommendations from ID 

for choice and duration of antibiotics once final culture and sensitivity is 

available





02/07/2021 


Patient is seen and evaluated in room at bedside.  Patient is sitting up in 

bedside chair.  Awake and alert in no acute distress.  Patient reports swelling 

in his left arm for past few days which has worsened overnight.   He did have a 

Doppler of the left upper extremity that revealed some superficial thrombus but 

no DVT.  


Follow-up blood cultures reveal no growth.  White count 12.9.  Hemoglobin 12.4. 

Sodium 136.  Potassium 4.8.  Creatinine 1.07.  


Patient remains on IV ceftriaxone per ID recommendations; ID recommending a 

possible SOTO for source of infection





Objective





- Vital Signs


Vital signs: 


                                   Vital Signs











Temp  98.3 F   02/07/21 04:00


 


Pulse  67   02/07/21 04:00


 


Resp  18   02/07/21 04:00


 


BP  150/88   02/07/21 04:00


 


Pulse Ox  96   02/07/21 04:00








                                 Intake & Output











 02/06/21 02/07/21 02/07/21





 18:59 06:59 18:59


 


Intake Total 486  240


 


Balance 486  240


 


Weight  106 kg 


 


Intake:   


 


  Oral 486  240


 


Other:   


 


  Voiding Method Toilet Toilet 














- Exam





GENERAL EXAM: Alert, pleasant 55-year-old male patient, on room air, in no 

apparent distress.


HEAD: Normocephalic.


EYES: Normal reaction of pupils, equal size.


NOSE: Clear with pink turbinates.


THROAT: No erythema or exudates.


NECK: No masses, no JVD.


CHEST: No chest wall deformity.


LUNGS: Equal air entry with no crackles, wheeze, rhonchi or dullness.


CVS: S1 and S2 normal with no audible murmur, regular rhythm.


ABDOMEN: No hepatosplenomegaly, normal bowel sounds, no guarding or rigidity.


CENTRAL NERVOUS SYSTEM: No focal deficits, tone is normal in all 4 extremities.


EXTREMITIES: There is no peripheral edema.  No clubbing, no cyanosis.  

Peripheral pulses are intact.





- Labs


CBC & Chem 7: 


                                 02/07/21 10:36





                                 02/07/21 10:36


Labs: 


                  Abnormal Lab Results - Last 24 Hours (Table)











  02/06/21 02/06/21 02/06/21 Range/Units





  11:55 17:08 20:22 


 


POC Glucose (mg/dL)  246 H  132 H  136 H  (75-99)  mg/dL














  02/07/21 Range/Units





  06:19 


 


POC Glucose (mg/dL)  171 H  (75-99)  mg/dL








                      Microbiology - Last 24 Hours (Table)











 02/05/21 06:48 Blood Culture - Preliminary





 Blood    No Growth after 48 hours


 


 02/03/21 12:24 Blood Culture - Preliminary





 Blood    No Growth after 72 hours


 


 02/04/21 08:13 Blood Culture - Preliminary





 Blood    No Growth after 48 hours














Assessment and Plan


Assessment: 


-Diabetic ketoacidosis patient has type 2 diabetes mellitus : Ketoacidosis 

resolved patient the blood sugars although still not well controlled will 

increase the dose of long-acting as well as prenatal insulin


-Chest pain, ruled out for pulmonary embolism.  Possibility of non-ST elevation 

myocardial infarction patient had had a cardiac catheterization and cardiac cath

report as mentioned above.


-Bacteremia with the strep species repeat blood cultures and without and patient

was started on Rocephin infectious disease will be consulted patient may have 

pneumococcal pneumonia.  Repeat Covid 19 was negative as well.  Patient probably

has bacterial pneumonia which contributed to bacteremia.  Patient is presently 

on Rocephin and vancomycin.  Vancomycin can be discontinued after finalization 

of the cultures


- history of cardiomyopathy previous to be ischemic cardiomyopathy severe 

systolic dysfunction doesn't appear to be in acute exacerbation, disc any IV 

fluids repeat chest x-ray did not show any pulmonary edema


-Coronary artery disease


-Type 2 diabetes mellitus uncontrolled elevated blood sugars secondary to 

noncompliance


-Hyperlipidemia


-Hypertension: Patient will be resumed on lisinopril discontinue amlodipine


-Acute renal failure probably secondary to intravascular depletion from DKA 

improved creatinine.  Unsure whether patient has chronic kidney disease from 

diabetes


-DVT prophylaxis with subcutaneous Lovenox GI prophylaxis with Protonix

## 2021-02-07 NOTE — P.PN
Subjective


Progress Note Date: 02/07/21


Principal diagnosis: 





Chest pain





55-year-old black male who presents to the emergency department on February 2, 

at 8:52 in the morning, with complaints of chest discomfort.  The patient states

that his symptoms began the day prior to admission but relatively mild and they 

got worse as a night went on and into the early morning.  When he presented to 

the emergency department, his chest pain was mild again.  Apparently he told the

ER physician was difficult to describe.  There is no radiation.  The patient did

apparently have some mild shortness of breath as well.  He also apparently had 

an episode of hemoptysis.  In addition, the patient was apparently having a very

harsh cough.  Pain increased with deep breathing.  There is no nausea or 

diaphoresis.  There is no fever.  There was no leg pain or leg swelling.  The 

patient underwent cardiac catheterization today, and the final impression of the

cardiologist was that the patient had stable coronary disease, with a total 

occlusion of the circumflex and right coronary arteries, and an LAD which is 

free of any significant occlusive disease which provides collaterals to the RCA.

 The recommendation was that of risk factor modification, and maximal medical 

therapy.  The patient has a history of CAD, angina, heart failure, diabetes, 

essential hypertension, and sciatica, among other things.  The patient does have

a history of substantial chronic tobacco use, and could have underlying COPD.  

He also has used marijuana in the past.  He has never seen a pulmonologist in 

the past.





The patient is seen today 02/04/2021 in follow-up on the selective care unit.  

He is currently awake and alert in no acute distress.  He is still having 

ongoing issues with chest pain.  Cardiac catheterization revealed coronary 

artery disease with a total occlusion of the circumflex and right coronary 

artery.  LAD was free of any significant occlusive disease which provides 

collaterals to the RCA.  The plan is to treat medically.  Chest x-ray reveals 

evidence of cardiomegaly but no acute pulmonary process.  Blood culture 

revealing streptococcus species.  Currently on vancomycin.  Sodium 137.  

Potassium 4.3.  Creatinine 1.09.  Glucose 269.  Remains on ceftriaxone.





The patient is seen today 02/05/2021 in follow-up on the selective care unit.  

He is currently sitting up in a chair at the bedside.  Awake and alert in no 

acute distress.  He is maintaining O2 saturations in the mid 90s on room air.  

He's afebrile.  Hemodynamically stable.  Initial blood cultures were positive 

for alpha hemolytic streptococcus.  Follow-up blood cultures revealing no growth

to date.  White count 11.1.  Hemoglobin 11.6.  Sodium 134.  Potassium 4.2.  

Creatinine 0.99.  He is currently on vancomycin and ceftriaxone.





The patient is seen today 02/06/2021 in follow-up on the selective care unit.  

He is currently resting comfortably in bed.  Awake and alert in no acute 

distress.  He is maintaining O2 saturations in the 90s on room air.  He's a

febrile.  CAT scan of the abdomen and pelvis revealed no abnormalities.  Mild 

atelectatic lung changes.  Follow-up blood cultures revealed no growth.  Blood 

glucose 246.  He remains on ceftriaxone.





The patient is seen today 02/07/2021 in follow-up on the selective care unit.  

He is up ambulating in his room.  Awake and alert in no acute distress.  No 

worsening shortness of breath, cough or congestion.  18 and O2 saturations in 

the 90s on room air.  Afebrile.  Hemodynamically stable.  He did have a Doppler 

of the left upper extremity that revealed some superficial thrombus but no DVT. 

Follow-up blood cultures reveal no growth.  White count 12.9.  Hemoglobin 12.4. 

Sodium 136.  Potassium 4.8.  Creatinine 1.07.  Remains on ceftriaxone.





Objective





- Vital Signs


Vital signs: 


                                   Vital Signs











Temp  98.4 F   02/07/21 08:00


 


Pulse  70   02/07/21 08:00


 


Resp  18   02/07/21 08:00


 


BP  148/83   02/07/21 08:00


 


Pulse Ox  96   02/07/21 08:00








                                 Intake & Output











 02/06/21 02/07/21 02/07/21





 18:59 06:59 18:59


 


Intake Total 486  240


 


Balance 486  240


 


Weight  106 kg 


 


Intake:   


 


  Oral 486  240


 


Other:   


 


  Voiding Method Toilet Toilet Toilet














- Exam





GENERAL EXAM: Alert, pleasant 55-year-old male patient, on room air, in no 

apparent distress.


HEAD: Normocephalic.


EYES: Normal reaction of pupils, equal size.


NOSE: Clear with pink turbinates.


THROAT: No erythema or exudates.


NECK: No masses, no JVD.


CHEST: No chest wall deformity.


LUNGS: Equal air entry with no crackles, wheeze, rhonchi or dullness.


CVS: S1 and S2 normal with no audible murmur, regular rhythm.


ABDOMEN: No hepatosplenomegaly, normal bowel sounds, no guarding or rigidity.


SPINE: No scoliosis or deformity


SKIN: No rashes


CENTRAL NERVOUS SYSTEM: No focal deficits, tone is normal in all 4 extremities.


EXTREMITIES: There is no peripheral edema.  No clubbing, no cyanosis.  

Peripheral pulses are intact.





- Labs


CBC & Chem 7: 


                                 02/07/21 10:36





                                 02/07/21 10:36


Labs: 


                  Abnormal Lab Results - Last 24 Hours (Table)











  02/06/21 02/06/21 02/07/21 Range/Units





  17:08 20:22 06:19 


 


WBC     (3.8-10.6)  k/uL


 


Hgb     (13.0-17.5)  gm/dL


 


Hct     (39.0-53.0)  %


 


Sodium     (137-145)  mmol/L


 


Glucose     (74-99)  mg/dL


 


POC Glucose (mg/dL)  132 H  136 H  171 H  (75-99)  mg/dL


 


C-Reactive Protein     (<10.0)  mg/L














  02/07/21 02/07/21 02/07/21 Range/Units





  10:36 10:36 11:28 


 


WBC  12.9 H    (3.8-10.6)  k/uL


 


Hgb  12.4 L    (13.0-17.5)  gm/dL


 


Hct  38.3 L    (39.0-53.0)  %


 


Sodium   136 L   (137-145)  mmol/L


 


Glucose   243 H   (74-99)  mg/dL


 


POC Glucose (mg/dL)    224 H  (75-99)  mg/dL


 


C-Reactive Protein   18.1 H   (<10.0)  mg/L








                      Microbiology - Last 24 Hours (Table)











 02/04/21 08:13 Blood Culture - Preliminary





 Blood    No Growth after 72 hours


 


 02/05/21 06:48 Blood Culture - Preliminary





 Blood    No Growth after 48 hours


 


 02/03/21 12:24 Blood Culture - Preliminary





 Blood    No Growth after 72 hours














Assessment and Plan


Assessment: 





1 Acute chest pain, status post cardiac catheterization, with stable coronary 

disease, with total occlusion of the circumflex and right coronary artery, and 

an LAD, which is free of any significant occlusive disease and provides 

collaterals to the right coronary artery.





2 Shortness of breath, with a pleuritic component, and a CT angiogram, that 

suggest atypical pneumonia/coronavirus infection even though the patient's test 

was negative.  This could represent a viral pneumonia with viral pleurisy.





3 Probable underlying COPD from previous heavy tobacco use.





4 Blood cultures with evidence of gram-positive cocci in chains, from 

02/02/2021.





5 Ongoing tobacco use with nicotine addiction.





6 History of chronic marijuana use.





7 History of coronary artery disease.





8 History of hyperlipidemia.





9 History of diabetes mellitus.





10 History of hypertension.





11 History of congestive heart failure.





12 History of angina pectoris.





13 Prior history of MRSA infection.





Plan:





The patient was seen and evaluated by Dr. Nicci Lopes from the pulmonary standpoint


Antibiotics per ID services


We will see as needed





I, the cosigning physician, performed a history & physical examination of the 

patient. Lungs sounds are clear.  Maintaining good O2 saturations in the 90s on 

room air.  I discussed the assessment and plan of care with my nurse 

practitioner, Sabrina White. I attest to the above note as dictated by her.

## 2021-02-07 NOTE — PN
PROGRESS NOTE



DATE OF SERVICE:

02/07/2021



REASON FOR FOLLOWUP:

Staphylococcus bacteremia, question of endovascular source.



INTERVAL HISTORY:

The patient is currently afebrile.  Patient is breathing comfortably. The patient

denies having any chest pain, shortness of breath.  Occasional cough.  No nausea.  No

vomiting. No abdominal pain.  No diarrhea.



PHYSICAL EXAMINATION:

Blood pressure 154/95 with a pulse of 72, temperature 98.3.  He is 99% on room air.

GENERAL DESCRIPTION: The patient is a middle-aged male up in the room in no distress.

RESPIRATORY system: Unlabored breathing with decreased breath sounds at bases.  No

wheeze.

HEART: S1, S2.  Regular rate and rhythm.

ABDOMEN:  Soft, no tenderness.



LABS:

Hemoglobin is 12.4, white count 4.9, BUN of 16, creatinine 1.07.  Blood culture repeat

has been negative.



DIAGNOSTIC IMPRESSION AND PLAN:

Patient with streptococcal bacteremia possible endovascular source.  The patient did

have extensive workup.  No evidence of any obvious focus. SOTO will be requested.

Continue with Rocephin and monitor clinical course closely.





MMODL / IJN: [id number] / Job#: [id number]

## 2021-02-07 NOTE — US
EXAMINATION TYPE: US venous doppler duplex UE LT

 

DATE OF EXAM: 2/7/2021

 

COMPARISON: NONE

 

CLINICAL HISTORY: Swelling, pain, dvt. left arm swelling.  Lump at iv site on left lower arm. 

 

SIDE PERFORMED: Left

 

 

 

Left Arm: Negative for DVT.  Positive for SVT at IV site, internal echoes visualized.  

 

 

 

IMPRESSION: 

Findings compatible with superficial vein thrombosis. No evidence for DVT.

## 2021-02-08 LAB
ANION GAP SERPL CALC-SCNC: 8.7 MMOL/L (ref 4–12)
BASOPHILS # BLD AUTO: 0.04 X 10*3/UL (ref 0–0.1)
BASOPHILS NFR BLD AUTO: 0.3 %
BUN SERPL-SCNC: 15 MG/DL (ref 9–27)
BUN/CREAT SERPL: 12.5 RATIO (ref 12–20)
CALCIUM SPEC-MCNC: 8.6 MG/DL (ref 8.7–10.3)
CHLORIDE SERPL-SCNC: 103 MMOL/L (ref 96–109)
CO2 SERPL-SCNC: 23.3 MMOL/L (ref 21.6–31.8)
EOSINOPHIL # BLD AUTO: 0.2 X 10*3/UL (ref 0.04–0.35)
EOSINOPHIL NFR BLD AUTO: 1.7 %
ERYTHROCYTE [DISTWIDTH] IN BLOOD BY AUTOMATED COUNT: 4.06 X 10*6/UL (ref 4.4–5.6)
ERYTHROCYTE [DISTWIDTH] IN BLOOD: 15 % (ref 11.5–14.5)
GLUCOSE BLD-MCNC: 214 MG/DL (ref 75–99)
GLUCOSE BLD-MCNC: 251 MG/DL (ref 75–99)
GLUCOSE BLD-MCNC: 267 MG/DL (ref 75–99)
GLUCOSE BLD-MCNC: 384 MG/DL (ref 75–99)
GLUCOSE SERPL-MCNC: 250 MG/DL (ref 70–110)
HCT VFR BLD AUTO: 35.2 % (ref 39.6–50)
HGB BLD-MCNC: 11.2 G/DL (ref 13–17)
LYMPHOCYTES # SPEC AUTO: 4.38 X 10*3/UL (ref 0.9–5)
LYMPHOCYTES NFR SPEC AUTO: 37.2 %
MCH RBC QN AUTO: 27.6 PG (ref 27–32)
MCHC RBC AUTO-ENTMCNC: 31.8 G/DL (ref 32–37)
MCV RBC AUTO: 86.7 FL (ref 80–97)
MONOCYTES # BLD AUTO: 1.25 X 10*3/UL (ref 0.2–1)
MONOCYTES NFR BLD AUTO: 10.6 %
NEUTROPHILS # BLD AUTO: 5.79 X 10*3/UL (ref 1.8–7.7)
NEUTROPHILS NFR BLD AUTO: 49.3 %
PLATELET # BLD AUTO: 241 X 10*3/UL (ref 140–440)
POTASSIUM SERPL-SCNC: 4.6 MMOL/L (ref 3.5–5.5)
SODIUM SERPL-SCNC: 135 MMOL/L (ref 135–145)
WBC # BLD AUTO: 11.76 X 10*3/UL (ref 4.5–10)

## 2021-02-08 RX ADMIN — PANTOPRAZOLE SODIUM SCH MG: 40 TABLET, DELAYED RELEASE ORAL at 07:59

## 2021-02-08 RX ADMIN — INSULIN ASPART SCH UNIT: 100 INJECTION, SOLUTION INTRAVENOUS; SUBCUTANEOUS at 08:00

## 2021-02-08 RX ADMIN — INSULIN ASPART SCH UNIT: 100 INJECTION, SOLUTION INTRAVENOUS; SUBCUTANEOUS at 08:01

## 2021-02-08 RX ADMIN — ISOSORBIDE MONONITRATE SCH MG: 30 TABLET, EXTENDED RELEASE ORAL at 08:00

## 2021-02-08 RX ADMIN — METOPROLOL TARTRATE SCH MG: 25 TABLET, FILM COATED ORAL at 21:37

## 2021-02-08 RX ADMIN — INSULIN DETEMIR SCH UNIT: 100 INJECTION, SOLUTION SUBCUTANEOUS at 21:37

## 2021-02-08 RX ADMIN — INSULIN ASPART SCH: 100 INJECTION, SOLUTION INTRAVENOUS; SUBCUTANEOUS at 12:06

## 2021-02-08 RX ADMIN — INSULIN ASPART SCH UNIT: 100 INJECTION, SOLUTION INTRAVENOUS; SUBCUTANEOUS at 12:14

## 2021-02-08 RX ADMIN — INSULIN ASPART SCH UNIT: 100 INJECTION, SOLUTION INTRAVENOUS; SUBCUTANEOUS at 16:54

## 2021-02-08 RX ADMIN — ASPIRIN 81 MG CHEWABLE TABLET SCH MG: 81 TABLET CHEWABLE at 07:59

## 2021-02-08 RX ADMIN — INSULIN ASPART SCH: 100 INJECTION, SOLUTION INTRAVENOUS; SUBCUTANEOUS at 21:38

## 2021-02-08 RX ADMIN — METOPROLOL TARTRATE SCH MG: 25 TABLET, FILM COATED ORAL at 07:59

## 2021-02-08 RX ADMIN — ATORVASTATIN CALCIUM SCH MG: 80 TABLET, FILM COATED ORAL at 08:00

## 2021-02-08 RX ADMIN — ENOXAPARIN SODIUM SCH MG: 40 INJECTION SUBCUTANEOUS at 08:00

## 2021-02-08 NOTE — PN
PROGRESS NOTE



DATE OF SERVICE:

02/08/2021



REASON FOR FOLLOWUP:

Enterococcal bacteremia.



INTERVAL HISTORY:

The patient is currently afebrile.  The patient is breathing comfortably.  He denies

having any chest pain or shortness of breath. Minimal cough.  No nausea, no vomiting,

no abdominal pain or diarrhea.



PHYSICAL EXAMINATION:

Blood pressure 136/87, pulse of 70, temperature 98.5.  He is 97% on room air.

General description is a middle-aged male up in the room in no distress.

RESPIRATORY SYSTEM: Unlabored breathing with decreased intensity of breath sounds. No

wheeze.

HEART: S1, S2.  Regular rate and rhythm.

ABDOMEN: Soft. No tenderness.

EXTREMITIES: No edema of the feet.



LABS:

Hemoglobin 11.1, white count 11.76.  BUN of 15, creatinine 1.2.



DIAGNOSTIC IMPRESSION AND PLAN:

Patient with alpha Streptococcus bacteremia. Follow-up blood cultures have been

negative. This patient presented to hospital with sepsis. He did have extensive workup.

CT angiogram of the chest did show some ground-glass opacity, but no consolidation. CT

of abdomen and pelvis was negative as well.  Images were reviewed with Dr. Garcia, no

evidence of any involvement of the spine and the patient's low back pain. The patient

needs a SOTO to make sure there is no evidence of any endocarditis, which if negative,

he will be switched to oral antibiotic and discharged home.





MMODL / IJN: [id number] / Job#: [id number]

## 2021-02-08 NOTE — CDI
Documentation Clarification Form



Date: 02/08/2021 09:04:12 AM

From: Nadiya Silveira RN CCDS

Phone: [phone]

MRN: [id number]

Admit Date: 02/02/2021 11:27:00 AM

Patient Name: Kayode Martines

Visit Number: [id number]

Discharge Date:  



ATTENTION: The Clinical Documentation Specialists (CDI) and [facility] Coding Staff 
appreciate your assistance in clarifying documentation. Please respond to the 
clarification below the line at the bottom and electronically sign. The CDI & 
[facility] Coding staff will review the response and follow-up if needed. Please note: 
Queries are made part of the Legal Health Record. If you have any questions, 
please contact the author of this message via ITS.



Dr. Martinez, 



There is some potential for infection and sepsis diagnosed at 11:20AM. 
Documented in the ED Note 2/2/2021



History/Risk Factors: 55-year-old male presents to the ED with chest discomfort,
cough, hemoptysis and a little short of breath. Medical history: CAD; CHF; 
Chronic angina; TYPE 2 NSTEMI, DM 2 and HTN

Clinical Indicators:

2/2 WBC: 17.4        

2/2 Lactic acid: 1.2

2/2 Blood cultures: Alpha Hemolytic Streptococcus 

2/2 Vital signs on admission: B/P 136/87; HR 98; Temp 99.1 F Oral; RR 16; SpO2 
97% ra

2/2 CXR: Pulmonary venous congestion without overt failure. 

Treatment:

2/3 ID Consult: Now with evidence of positive blood culture with Streptococcus 
and a question of possible streptococcal pneumonia, bacteremia and possible 
source pneumonia.

Antibiotics: 2/2 Azithromycin IVPB x 1; 2/3 Azithromycin PO Daily d/c 2/3; 2/2 
Rocephin Ivpb x 1;  2/3 Rocephin IVPB Q24H TIANA

IV Bolus: 2/2 0.9NS 500cc IV bolus x 1; 2/2 0.9NS IV 50cc/HR d/c 2/3.



In your professional opinion, please clarify if these findings signify one of 
the following conditions, whether the condition is POA, and cause, if known:



   

   Sepsis POA related to Pneumonia

   Sepsis POA please specify related to ______________

   Sepsis ruled out

   Other, please specify   _____________

   Unable to determine





Identify the (suspected) organism______________



SIRS Criteria (2 or more of the following may indicate SIRS):

-Temperature   < 96.8F (36C) or > 101.0F (38.3C)

-Heart Rate   > 90 bpm

-Respiratory Rate   > 20 breaths/min or PaCO2 < 32 mmHg

-White Blood Cell Count   > 12,000 or < 4,000 cells/mm3 or > 10% bands

-Lactate   >2.0 mmol/L (>4.0 is equivalent to septic shock)





Documented 2/10 in DCS by ANGE Kowalski -Sepsis, present on admission related to 
pneumonia 



(Last Revision: April 2018)

KIRK

## 2021-02-09 VITALS — TEMPERATURE: 97.9 F | SYSTOLIC BLOOD PRESSURE: 124 MMHG | HEART RATE: 70 BPM | DIASTOLIC BLOOD PRESSURE: 77 MMHG

## 2021-02-09 VITALS — RESPIRATION RATE: 16 BRPM

## 2021-02-09 LAB
ANION GAP SERPL CALC-SCNC: 5.4 MMOL/L (ref 4–12)
BASOPHILS # BLD AUTO: 0.03 X 10*3/UL (ref 0–0.1)
BASOPHILS NFR BLD AUTO: 0.3 %
BUN SERPL-SCNC: 17 MG/DL (ref 9–27)
BUN/CREAT SERPL: 14.17 RATIO (ref 12–20)
CALCIUM SPEC-MCNC: 8.9 MG/DL (ref 8.7–10.3)
CHLORIDE SERPL-SCNC: 104 MMOL/L (ref 96–109)
CO2 SERPL-SCNC: 27.6 MMOL/L (ref 21.6–31.8)
EOSINOPHIL # BLD AUTO: 0.21 X 10*3/UL (ref 0.04–0.35)
EOSINOPHIL NFR BLD AUTO: 1.8 %
ERYTHROCYTE [DISTWIDTH] IN BLOOD BY AUTOMATED COUNT: 4.07 X 10*6/UL (ref 4.4–5.6)
ERYTHROCYTE [DISTWIDTH] IN BLOOD: 15.1 % (ref 11.5–14.5)
GLUCOSE BLD-MCNC: 120 MG/DL (ref 75–99)
GLUCOSE BLD-MCNC: 168 MG/DL (ref 75–99)
GLUCOSE BLD-MCNC: 282 MG/DL (ref 75–99)
GLUCOSE SERPL-MCNC: 204 MG/DL (ref 70–110)
HBA1C MFR BLD: 13.1 % (ref 4–6)
HCT VFR BLD AUTO: 35.6 % (ref 39.6–50)
HGB BLD-MCNC: 11.1 G/DL (ref 13–17)
LYMPHOCYTES # SPEC AUTO: 4.25 X 10*3/UL (ref 0.9–5)
LYMPHOCYTES NFR SPEC AUTO: 37.1 %
MCH RBC QN AUTO: 27.3 PG (ref 27–32)
MCHC RBC AUTO-ENTMCNC: 31.2 G/DL (ref 32–37)
MCV RBC AUTO: 87.5 FL (ref 80–97)
MONOCYTES # BLD AUTO: 1.28 X 10*3/UL (ref 0.2–1)
MONOCYTES NFR BLD AUTO: 11.2 %
NEUTROPHILS # BLD AUTO: 5.56 X 10*3/UL (ref 1.8–7.7)
NEUTROPHILS NFR BLD AUTO: 48.5 %
PLATELET # BLD AUTO: 238 X 10*3/UL (ref 140–440)
POTASSIUM SERPL-SCNC: 4.7 MMOL/L (ref 3.5–5.5)
SODIUM SERPL-SCNC: 137 MMOL/L (ref 135–145)
WBC # BLD AUTO: 11.46 X 10*3/UL (ref 4.5–10)

## 2021-02-09 PROCEDURE — B246ZZ4 ULTRASONOGRAPHY OF RIGHT AND LEFT HEART, TRANSESOPHAGEAL: ICD-10-PCS

## 2021-02-09 RX ADMIN — BENZOCAINE ONE SPRAY: 200 SPRAY DENTAL; ORAL; PERIODONTAL at 12:02

## 2021-02-09 RX ADMIN — ISOSORBIDE MONONITRATE SCH MG: 30 TABLET, EXTENDED RELEASE ORAL at 07:39

## 2021-02-09 RX ADMIN — ENOXAPARIN SODIUM SCH: 40 INJECTION SUBCUTANEOUS at 06:56

## 2021-02-09 RX ADMIN — PANTOPRAZOLE SODIUM SCH MG: 40 TABLET, DELAYED RELEASE ORAL at 07:39

## 2021-02-09 RX ADMIN — ASPIRIN 81 MG CHEWABLE TABLET SCH MG: 81 TABLET CHEWABLE at 07:38

## 2021-02-09 RX ADMIN — METOPROLOL TARTRATE SCH MG: 25 TABLET, FILM COATED ORAL at 07:39

## 2021-02-09 RX ADMIN — MIDAZOLAM ONE MG: 1 INJECTION INTRAMUSCULAR; INTRAVENOUS at 12:12

## 2021-02-09 RX ADMIN — BENZOCAINE ONE SPRAY: 200 SPRAY DENTAL; ORAL; PERIODONTAL at 12:07

## 2021-02-09 RX ADMIN — MIDAZOLAM ONE MG: 1 INJECTION INTRAMUSCULAR; INTRAVENOUS at 12:07

## 2021-02-09 RX ADMIN — INSULIN ASPART SCH: 100 INJECTION, SOLUTION INTRAVENOUS; SUBCUTANEOUS at 07:31

## 2021-02-09 RX ADMIN — INSULIN ASPART SCH UNIT: 100 INJECTION, SOLUTION INTRAVENOUS; SUBCUTANEOUS at 07:40

## 2021-02-09 RX ADMIN — ATORVASTATIN CALCIUM SCH MG: 80 TABLET, FILM COATED ORAL at 07:39

## 2021-02-09 RX ADMIN — INSULIN ASPART SCH UNIT: 100 INJECTION, SOLUTION INTRAVENOUS; SUBCUTANEOUS at 17:00

## 2021-02-09 RX ADMIN — INSULIN ASPART SCH: 100 INJECTION, SOLUTION INTRAVENOUS; SUBCUTANEOUS at 14:06

## 2021-02-09 NOTE — CDI
Documentation Clarification Form



Date: 02/09/2021 12:15:42 PM

From: Nadiya Silveira RN CCDS

Phone: [phone]

MRN: [id number]

Admit Date: 02/02/2021 11:27:00 AM

Patient Name: Kayode Martines

Visit Number: [id number]

Discharge Date:  





ATTENTION: The Clinical Documentation Specialists (CDI) and [facility] Coding Staff 
appreciate your assistance in clarifying documentation. Please respond to the 
clarification below the line at the bottom and electronically sign. The CDI & 
[facility] Coding staff will review the response and follow-up if needed. Please note: 
Queries are made part of the Legal Health Record. If you have any questions, 
please contact the author of this message via ITS.



Dr. Bernie Martinez



The COVID-19 test obtained on 2/2 was reported as Negative on 2/2. 

The COVID-19 test obtained on 2/3 was reported as Negative on 2/3. 





Per Internal medicine progress note 2/8 Suspected Covid 19 infection. Repeat 
Covid 19 was negative.



Patient history/risk factors: 55-year old male presented to the ED with sharp 
chest pain. Medical History: Type2 DM, HTN, HLD, MI and tobacco smoker. 
Documented in the H&P 2/2  



Clinical Indicators 

Patient reported not feeling well, tiredness and chest pain. Documented in H&P 
2/2 2/2 CXR: There is pulmonary venous congestion without overt failure. 

2/2 CT Chest: Scattered ground glass opacities bilaterally. 

2/2 VS in ED Triage: B/P 136/87; HR 98; Temp 99.1 F Oral; RR 16; SpO2 97% room 
air 

2/2 Labs: Wbc 17.4; D-dimer 1.06 



Treatment: 

2/3 Pulmonary consult: Shortness of breath, with a pleuritic component, and a 
CT angiogram, that suggest atypical pneumonia/coronavirus infection even though 
the patients test was negative. This could represent a viral pneumonia with 
viral pleurisy.

2/2 Azithromycin IVPB x1; 2/3 Zithromax PO Daily d/c 2/3; 2/2 Ceftriaxone IVPB 
x1; 2/3 Rocephin IVPB Q24H. 2/3 & 2/4 Vancomycin IVPB d/c 2/4. 



In order to capture the severity of condition, please clarify the COVID-19 
status:



   False negative, treating for COVID-19 infection

   COVID-19 ruled out

   Other, please specify __________



2/10 Documented in the DCS by NP Meghana Covid 19 ruled out, 2 tests were 
negative 



(Last Form Revision: March 2020)

MTDD

## 2021-02-09 NOTE — ECHOT
TRANSESOPHAGEAL ECHOCARDIOGRAM



INDICATION:

Evaluation of possible endocarditis.



PROCEDURE:

After explaining the procedure to the patient, its risks and the complications, his

blood pressure, heart rate, O2 saturation were monitored.  The throat was sprayed with

Cetacaine. He received 3 mg intravenous Versed, 50 mcg intravenous fentanyl.  The probe

was introduced in the esophagus without difficulty.  Images were obtained.  Following

that, the probe was removed. There was no immediate complication.



FINDINGS:

Left atrial size is mildly dilated.  Left ventricular size is normal. There is evidence

of mild inferior wall hypokinesis, estimated ejection fraction 40% to 45%.  The aortic

valve, mitral valve and tricuspid valve are normal. Descending thoracic aorta appears

to be normal.  No pericardial effusion was noted. Contrast bubble study revealed no

evidence shunting across the interatrial septum.



Doppler pulse wave and color Doppler obtained and revealed moderate mitral with mild

tricuspid regurgitation.  There was no shunting by color Doppler study.



CONCLUSION:

1. Moderately impaired left ventricular systolic function.

2. Normal appearance left atrial appendage.

3. Moderate mitral with mild tricuspid regurgitation.

4. No evidence of vegetations.

5. No pericardial effusion.





MMODL / IJN: [id number] / Job#: [id number]

## 2021-02-09 NOTE — P.PN
Subjective


Progress Note Date: 02/08/21


Principal diagnosis: 





Chest pain, NSTEMI


Streptococcal bacteremia





02/05/2021 


patient seen in follow-up on the selective care unit.  He is currently sitting 

up in a chair at the bedside.  Awake and alert in no acute distress.  He is 

maintaining O2 saturations in the mid 90s on room air.  He's afebrile.  

Hemodynamically stable.  Initial blood cultures were positive for alpha hem

olytic streptococcus.  Follow-up blood cultures revealing no growth to date.  

White count 11.1.  Hemoglobin 11.6.  Sodium 134.  Potassium 4.2.  Creatinine 

0.99.  He is currently on vancomycin and ceftriaxone.





02/06/2021 


Patient is seen and evaluated at bedside in follow-up on the selective care 

unit.  He is currently resting comfortably in bed.  Awake and alert in no acute 

distress.  He is maintaining O2 saturations in the 90s on room air.  He's 

afebrile.  CAT scan of the abdomen and pelvis revealed no abnormalities.  Mild 

atelectatic lung changes.  Follow-up blood cultures revealed no growth.  Blood 

glucose 246.  He remains on ceftriaxone.  Await final recommendations from ID 

for choice and duration of antibiotics once final culture and sensitivity is 

available





02/07/2021 


Patient is seen and evaluated in room at bedside.  Patient is sitting up in 

bedside chair.  Awake and alert in no acute distress.  Patient reports swelling 

in his left arm for past few days which has worsened overnight.   He did have a 

Doppler of the left upper extremity that revealed some superficial thrombus but 

no DVT.  


Follow-up blood cultures reveal no growth.  White count 12.9.  Hemoglobin 12.4. 

Sodium 136.  Potassium 4.8.  Creatinine 1.07.  


Patient remains on IV ceftriaxone per ID recommendations; ID recommending a 

possible SOTO for source of infection





02/08/2021


Patient is currently sitting on the side of bed and is comfortable.  No 

complaints of chest pain or shortness of breath.  Shortness of breath is much 

improved.  Minimal cough.  Patient has been afebrile.  left upper extremity 

duplex scan showed superficial thrombus.  No DVT.


No nausea vomiting or abdominal pain or diarrhea.  Patient says that his left 

upper extremity swelling is improving and is able to flex his hand better.


Patient is being continued on ceftriaxone for streptococcal bacteremia


Patient had CT angiogram of the chest showed ground glass opacity but no 

consolidation.  CT abdomen pelvis was negative for acute process.


SOTO was ordered to rule out any endocarditis due to strep bacteremia.  

Cardiology was consulted and ID is on board.





current medications reviewed.





Objective





- Vital Signs


Vital signs: 


                                   Vital Signs











Temp  98.5 F   02/08/21 14:00


 


Pulse  70   02/08/21 14:00


 


Resp  18   02/08/21 14:00


 


BP  136/87   02/08/21 14:00


 


Pulse Ox  97   02/08/21 14:00








                                 Intake & Output











 02/07/21 02/08/21 02/08/21





 18:59 06:59 18:59


 


Intake Total 1020  


 


Balance 1020  


 


Weight  105.5 kg 


 


Intake:   


 


  Oral 1020  


 


Other:   


 


  Voiding Method Toilet Toilet Toilet


 


  # Voids  2 


 


  # Bowel Movements  0 














- Exam





- Exam





GENERAL EXAM: Alert, pleasant 55-year-old male patient, on room air, in no 

apparent distress.


HEAD: Normocephalic.


EYES: Normal reaction of pupils, equal size.


NOSE: Clear with pink turbinates.


THROAT: No erythema or exudates.


NECK: No masses, no JVD.


CHEST: No chest wall deformity.


LUNGS: Equal air entry with no crackles, wheeze, rhonchi or dullness.


CVS: S1 and S2 normal with no audible murmur, regular rhythm.


ABDOMEN: No hepatosplenomegaly, normal bowel sounds, no guarding or rigidity.


CENTRAL NERVOUS SYSTEM: No focal deficits, tone is normal in all 4 extremities.


EXTREMITIES: There is no peripheral edema.  No clubbing, no cyanosis.  

Peripheral pulses are intact.








- Labs


CBC & Chem 7: 


                                 02/09/21 06:05





                                 02/09/21 06:05


Labs: 


                  Abnormal Lab Results - Last 24 Hours (Table)











  02/07/21 02/07/21 02/07/21 Range/Units





  10:36 17:01 17:14 


 


WBC     (4.50-10.00)  X 10*3/uL


 


RBC     (4.40-5.60)  X 10*6/uL


 


Hgb     (13.0-17.0)  g/dL


 


Hct     (39.6-50.0)  %


 


MCHC     (32.0-37.0)  g/dL


 


RDW     (11.5-14.5)  %


 


MPV     (9.5-12.2)  fL


 


Immature Gran #     (0.00-0.04)  X 10*3/uL


 


Monocytes #     (0.20-1.00)  X 10*3/uL


 


Glucose     ()  mg/dL


 


POC Glucose (mg/dL)   187 H  173 H  (75-99)  mg/dL


 


Calcium     (8.7-10.3)  mg/dL


 


C-Reactive Protein     (0.0-0.8)  mg/dL


 


Procalcitonin  0.20 H    (0.02-0.09)  ng/mL














  02/07/21 02/08/21 02/08/21 Range/Units





  20:02 05:30 05:30 


 


WBC    11.76 H  (4.50-10.00)  X 10*3/uL


 


RBC    4.06 L  (4.40-5.60)  X 10*6/uL


 


Hgb    11.2 L  (13.0-17.0)  g/dL


 


Hct    35.2 L  (39.6-50.0)  %


 


MCHC    31.8 L  (32.0-37.0)  g/dL


 


RDW    15.0 H  (11.5-14.5)  %


 


MPV    12.4 H  (9.5-12.2)  fL


 


Immature Gran #    0.10 H  (0.00-0.04)  X 10*3/uL


 


Monocytes #    1.25 H  (0.20-1.00)  X 10*3/uL


 


Glucose     ()  mg/dL


 


POC Glucose (mg/dL)  116 H    (75-99)  mg/dL


 


Calcium     (8.7-10.3)  mg/dL


 


C-Reactive Protein     (0.0-0.8)  mg/dL


 


Procalcitonin   0.15 H   (0.02-0.09)  ng/mL














  02/08/21 02/08/21 02/08/21 Range/Units





  05:30 06:51 11:26 


 


WBC     (4.50-10.00)  X 10*3/uL


 


RBC     (4.40-5.60)  X 10*6/uL


 


Hgb     (13.0-17.0)  g/dL


 


Hct     (39.6-50.0)  %


 


MCHC     (32.0-37.0)  g/dL


 


RDW     (11.5-14.5)  %


 


MPV     (9.5-12.2)  fL


 


Immature Gran #     (0.00-0.04)  X 10*3/uL


 


Monocytes #     (0.20-1.00)  X 10*3/uL


 


Glucose  250 H    ()  mg/dL


 


POC Glucose (mg/dL)   251 H  267 H  (75-99)  mg/dL


 


Calcium  8.6 L    (8.7-10.3)  mg/dL


 


C-Reactive Protein  1.4 H    (0.0-0.8)  mg/dL


 


Procalcitonin     (0.02-0.09)  ng/mL








                      Microbiology - Last 24 Hours (Table)











 02/03/21 12:24 Blood Culture - Preliminary





 Blood    No Growth after 120 hours


 


 02/04/21 08:13 Blood Culture - Preliminary





 Blood    No Growth after 96 hours


 


 02/05/21 06:48 Blood Culture - Preliminary





 Blood    No Growth after 72 hours














Assessment and Plan


Assessment: 





-Diabetic ketoacidosis patient has type 2 diabetes mellitus : Ketoacidosis 

resolved .  Patient was started on basal insulin and aspart 3 times a day before

meals.  Patient is still hyperglycemic today afternoon.  Titrate insulin dose as

needed.





-Chest pain, NSTEMI.  patient had had a cardiac catheterization and cardiac cath

report showed occluded RCA and circumflex which is chronic and long-standing.  

LAD is patent and does not show any significant stenosis.


 ruled out for pulmonary embolism. 





-Bacteremia with the strep species repeat blood cultures was ordered.  patient 

was started on Rocephin infectious disease.  ID is on board.  Recommends SOTO.





- Suspected Covid 19 infection.  Repeat Covid 19 was negative





- history of cardiomyopathy previous to be ischemic cardiomyopathy severe 

systolic dysfunction doesn't appear to be in acute exacerbation,. repeat chest 

x-ray did not show any pulmonary edema





-Coronary artery disease


-Type 2 diabetes mellitus uncontrolled elevated blood sugars secondary to 

noncompliance





-Hyperlipidemia





-Hypertension: Uncontrolled.  Patient will be resumed on lisinopril discontinue 

amlodipine





-Acute renal failure probably secondary to intravascular depletion from DKA 

improved creatinine.  Unsure whether patient has chronic kidney disease from 

diabetes.





-Left upper extremity superficial thrombus in the duplex scan.  No DVT noted.





-DVT prophylaxis with subcutaneous Lovenox GI prophylaxis with Protonix





Time with Patient: Greater than 30

## 2021-02-09 NOTE — P.PN
Subjective





HISTORY OF PRESENTING ILLNESS


This is a pleasant 55-year-old  male past medical history 

significant for coronary artery disease, hypertension, dyslipidemia and diabetes

mellitus.  He [facility] in [facility] Dr. Hernandez.  

He is seen and examined sitting up in the chair in no acute distress. He denies 

symptoms of chest pain, shortness of breath, dizziness or palpitations. Blood 

pressure 132/88 heart rate 62 afebrile and maintaining oxygen saturation on room

air. We wee originally following along with this patient secondary to unstable 

angina. He underwent heart catheterization revealing stable CAD with no gradient

across the aortic valve, left main free of disease, LAD with mild ectasia and no

critical lesions, cx near occluded and the proximal portion and RCA totally 

occluded in the proximal portion and distally with collarteral flow from the 

LAD. Stable from previous cath with no progression of disease. EF 30-35% with 

mild MR and mild TR. We have been asked to perform a SOTO to assess for ve

getation secondary to  streptococcus bacteremia. There has been no clear source 

documented thus far. Currently maintained on IV antibiotics, aspirin 81 mg 

daily, atorvastatin 80 mg daily, lasix 40 mg daily, imdur 90 mg daily, 

lisinopril 10 mg daily and lopressor 25 mg BID. Laboratory data reviewed, WBC 

11, hgb 11.1, plt 238, sodium 127, potassium 4.7 and creatinine 1.2. 





PHYSICAL EXAMINATION


CONSTITUTIONAL: No apparent distress. 


HEENT: Head is normocephalic. Pupils are equal, round. Sclerae anicteric. Mucous

membranes of the mouth are moist.  No JVD. No carotid bruit.


CHEST EXAMINATION: Lungs are clear to auscultation. No chest wall tenderness is 

noted on palpation or with deep breathing. 


HEART EXAMINATION: Regular rate and rhythm. S1, S2 heard. No murmurs, gallops or

rub.


EXTREMITIES: 2+ peripheral pulses, no lower extremity edema and no calf 

tenderness.





ASSESSMENT


Non-ST elevated myocardial infarction


Unstable angina


Chronic systolic heart failure


Leukocytosis


Diabetes mellitus, uncontrolled


Hypertension, noncompliant


Dyslipidemia





PLAN


Proceed with SOTO as requested. 


Procedure has been explained to the patient in detail along with risks. He is 

agreeable to move forward with the SOTO.





Nurse Practitioner note has been reviewed, I agree with a documented findings 

and plan of care.  Patient was seen and examined.





Objective





- Vital Signs


Vital signs: 


                                   Vital Signs











Temp  98.1 F   02/09/21 08:00


 


Pulse  62   02/09/21 08:00


 


Resp  16   02/09/21 08:00


 


BP  132/88   02/09/21 08:00


 


Pulse Ox  98   02/09/21 08:00








                                 Intake & Output











 02/08/21 02/09/21 02/09/21





 18:59 06:59 18:59


 


Weight  103.3 kg 


 


Other:   


 


  Voiding Method Toilet Toilet Toilet


 


  # Bowel Movements   0














- Labs


CBC & Chem 7: 


                                 02/09/21 06:05





                                 02/09/21 06:05


Labs: 


                  Abnormal Lab Results - Last 24 Hours (Table)











  02/08/21 02/08/21 02/08/21 Range/Units





  11:26 16:43 21:27 


 


WBC     (4.50-10.00)  X 10*3/uL


 


RBC     (4.40-5.60)  X 10*6/uL


 


Hgb     (13.0-17.0)  g/dL


 


Hct     (39.6-50.0)  %


 


MCHC     (32.0-37.0)  g/dL


 


RDW     (11.5-14.5)  %


 


MPV     (9.5-12.2)  fL


 


Immature Gran #     (0.00-0.04)  X 10*3/uL


 


Monocytes #     (0.20-1.00)  X 10*3/uL


 


Glucose     ()  mg/dL


 


POC Glucose (mg/dL)  267 H  214 H  384 H  (75-99)  mg/dL














  02/09/21 02/09/21 02/09/21 Range/Units





  06:05 06:05 07:24 


 


WBC  11.46 H    (4.50-10.00)  X 10*3/uL


 


RBC  4.07 L    (4.40-5.60)  X 10*6/uL


 


Hgb  11.1 L    (13.0-17.0)  g/dL


 


Hct  35.6 L    (39.6-50.0)  %


 


MCHC  31.2 L    (32.0-37.0)  g/dL


 


RDW  15.1 H    (11.5-14.5)  %


 


MPV  12.3 H    (9.5-12.2)  fL


 


Immature Gran #  0.13 H    (0.00-0.04)  X 10*3/uL


 


Monocytes #  1.28 H    (0.20-1.00)  X 10*3/uL


 


Glucose   204 H   ()  mg/dL


 


POC Glucose (mg/dL)    168 H  (75-99)  mg/dL








                      Microbiology - Last 24 Hours (Table)











 02/05/21 06:48 Blood Culture - Preliminary





 Blood    No Growth after 96 hours


 


 02/03/21 12:24 Blood Culture - Preliminary





 Blood    No Growth after 120 hours


 


 02/04/21 08:13 Blood Culture - Preliminary





 Blood    No Growth after 96 hours

## 2021-02-09 NOTE — PN
PROGRESS NOTE



DATE OF SERVICE:

02/09/2021



REASON FOR FOLLOWUP:

Streptococcal bacteremia.



INTERVAL HISTORY:

The patient is currently afebrile.  The patient is breathing comfortably.  He is

waiting for his SOTO. Patient denies having any chest pain, shortness of breath or

cough.  No nausea. No vomiting. No abdominal pain. No diarrhea.



PHYSICAL EXAMINATION:

Blood pressure 132/91 with pulse of 66, temperature 98.1. He is 97% on 2 L nasal

cannula.

General description is a middle-aged male up in the room in no distress.

RESPIRATORY SYSTEM: Unlabored breathing, clear to auscultation anteriorly.  HEART: S1,

S2.  Regular rate and rhythm.

ABDOMEN:  Soft, no tenderness.



LABS:

Hemoglobin 11.1, white count 11.46, creatinine 1.2.



DIAGNOSTIC IMPRESSION AND PLAN:

Patient with alpha _____streptococcal bacteremia, concern for pneumonia.  Chest CT did

not show significant consolidation rather ground-glass opacities.  CT of abdomen and

pelvis negative. If SOTO is negative, we will switch him over to oral Ceftin for a week

and close outpatient followup.





MMODL / IJN: [id number] / Job#: [id number]

## 2021-02-10 NOTE — P.DS
Providers


Date of admission: 


02/02/21 11:27





Expected date of discharge: 02/09/21


Attending physician: 


Lyndsey Pedroza





Consults: 





                                        





02/02/21 11:27


Consult Physician Urgent 


   Consulting Provider: Holland Mcgovern


   Consult Reason/Comments: dyspnea, elevated troponin


   Do you want consulting provider notified?: Yes


Consult Physician Urgent 


   Consulting Provider: Jason Grajeda


   Consult Reason/Comments: dyspnea


   Do you want consulting provider notified?: Yes





02/03/21 12:36


Consult Physician Routine 


   Consulting Provider: Soledad Cohen


   Consult Reason/Comments: Bacteremia


   Do you want consulting provider notified?: Yes





02/08/21 14:31


Consult Physician Urgent 


   Consulting Provider: Holland Mcgovern


   Consult Reason/Comments: bacteremia , r/o endocarditis /SOTO


   Do you want consulting provider notified?: Yes











Primary care physician: 


Physician Nonstaff





Hospital Course: 








Final diagnosis





-Covid 19 ruled out, 2 tests were negative


-Possible viral pneumonia with viral pleurisy


-Sepsis, present on admission related to pneumonia


-Diabetic ketoacidosis patient has type 2 diabetes mellitus 


-Chest pain, NSTEMI.  patient had had a cardiac catheterization and cardiac cath

report showed occluded RCA and circumflex which is chronic and long-standing.  

LAD is patent and does not show any significant stenosis.


-ruled out for pulmonary embolism. 


-Bacteremia with the strep species 


-history of cardiomyopathy, ischemic cardiomyopathy severe systolic dysfunction 

doesn't appear to be in acute exacerbation


-Coronary artery disease


-Type 2 diabetes mellitus uncontrolled elevated blood sugars secondary to 

noncompliance


-Hyperlipidemia


-Hypertension: Uncontrolled


-Acute renal failure probably secondary to intravascular depletion from DKA 

improved 


-Left upper extremity superficial thrombus in the duplex scan.  No DVT noted.


-DVT prophylaxis


-GI prophylaxis


-Full code





Discharge disposition


Patient is being discharged in a stable condition with guarded prognosis to 

home.  [facility].  Patient will 

continue with Ceftin 500 mg twice daily for the next 1 week to complete the 

course.  Total time taken is 35 minutes.





Hospital course





Chest pain, NSTEMI


Streptococcal bacteremia





02/05/2021 


patient seen in follow-up on the selective care unit.  He is currently sitting 

up in a chair at the bedside.  Awake and alert in no acute distress.  He is 

maintaining O2 saturations in the mid 90s on room air.  He's afebrile.  

Hemodynamically stable.  Initial blood cultures were positive for alpha 

hemolytic streptococcus.  Follow-up blood cultures revealing no growth to date. 

White count 11.1.  Hemoglobin 11.6.  Sodium 134.  Potassium 4.2.  Creatinine 

0.99.  He is currently on vancomycin and ceftriaxone.





02/06/2021 


Patient is seen and evaluated at bedside in follow-up on the selective care 

unit.  He is currently resting comfortably in bed.  Awake and alert in no acute 

distress.  He is maintaining O2 saturations in the 90s on room air.  He's 

afebrile.  CAT scan of the abdomen and pelvis revealed no abnormalities.  Mild 

atelectatic lung changes.  Follow-up blood cultures revealed no growth.  Blood 

glucose 246.  He remains on ceftriaxone.  Await final recommendations from ID 

for choice and duration of antibiotics once final culture and sensitivity is 

available





02/07/2021 


Patient is seen and evaluated in room at bedside.  Patient is sitting up in 

bedside chair.  Awake and alert in no acute distress.  Patient reports swelling 

in his left arm for past few days which has worsened overnight.   He did have a 

Doppler of the left upper extremity that revealed some superficial thrombus but 

no DVT.  


Follow-up blood cultures reveal no growth.  White count 12.9.  Hemoglobin 12.4. 

Sodium 136.  Potassium 4.8.  Creatinine 1.07.  


Patient remains on IV ceftriaxone per ID recommendations; ID recommending a 

possible SOTO for source of infection





02/08/2021


Patient is currently sitting on the side of bed and is comfortable.  No 

complaints of chest pain or shortness of breath.  Shortness of breath is much 

improved.  Minimal cough.  Patient has been afebrile.  left upper extremity 

duplex scan showed superficial thrombus.  No DVT.


No nausea vomiting or abdominal pain or diarrhea.  Patient says that his left 

upper extremity swelling is improving and is able to flex his hand better.


Patient is being continued on ceftriaxone for streptococcal bacteremia


Patient had CT angiogram of the chest showed ground glass opacity but no 

consolidation.  CT abdomen pelvis was negative for acute process.


SOTO was ordered to rule out any endocarditis due to strep bacteremia.  

Cardiology was consulted and ID is on board.





current medications reviewed.





02/09/2021


Patient seen and evaluated in follow-up currently underwent SOTO to rule out any 

endocarditis secondary to strep bacteremia.  SOTO was negative.  Most recent 

blood and sputum cultures remain negative and infectious disease was following. 

Patient will continue with oral Ceftin 500 mg twice daily for the next 1 week to

complete the course. Currently no reports of chest pain, shortness of breath, or

palpitations.  Patient is afebrile.  No reports of nausea or vomiting and 

patient is tolerating diet.  [facility] 

[facility] to discuss his diabetes regimen as he most likely need insulin for 

tighter glycemic control.  Guarded prognosis.





On exam vital signs are stable.  Temp is 97.9F, pulse is 70, respirations are 

16, blood pressure is 124/77, oxygen saturation is 96% on room air.  Cardio S1, 

S2 are muffled.  Respiratory shows diminished breath sounds at the bases with no

wheezing or rhonchi noted.  Abdomen is soft and nontender.  Nervous system shows

no focal deficits.





Please refer to medication reconciliation sheet for a list of medications.





Patient Condition at Discharge: Stable





Plan - Discharge Summary


Discharge Rx Participant: Yes


New Discharge Prescriptions: 


New


   Aspirin 81 mg PO DAILY 30 Days #30 chew


   Atorvastatin [Lipitor] 80 mg PO DAILY 30 Days #30 tab


   Metoprolol Tartrate [Lopressor] 25 mg PO BID 30 Days #60 tab


   Pantoprazole [Protonix] 40 mg PO AC-BRKFST 30 Days #30 tablet.


   Aspirin 81 mg PO DAILY #14 chewable


   Cefuroxime Axetil [Ceftin] 500 mg PO BID 7 Days #14 tab


   Atorvastatin Calcium [Lipitor] 80 mg PO DAILY 14 Days #14 tablet


   Metoprolol Tartrate [Lopressor] 25 mg PO BID 14 Days #28 tablet


   Pantoprazole Sodium [Protonix] 40 mg PO DAILY #14 tablet.


   Sennosides [Senokot] 8.6 mg PO BID PRN #10 tablet


     PRN Reason: Constipation





Continue


   lisinopriL [Zestril] 10 mg PO DAILY #30 tab


   Furosemide [Lasix] 40 mg PO DAILY #30 tab


   Nitroglycerin Sl Tabs [Nitrostat] 0.4 mg SUBLINGUAL Q5M PRN #30 tab


     PRN Reason: Chest Pain


   metFORMIN HCL [Glucophage] 500 mg PO BID


   Isosorbide Mononitrate ER [Imdur] 90 mg PO DAILY





Discontinued


   Atorvastatin [Lipitor] 40 mg PO DAILY


   amLODIPine [Norvasc] 2.5 mg PO DAILY


Discharge Medication List





Furosemide [Lasix] 40 mg PO DAILY #30 tab 06/21/16 [Rx]


Nitroglycerin Sl Tabs [Nitrostat] 0.4 mg SUBLINGUAL Q5M PRN #30 tab 06/21/16 

[Rx]


lisinopriL [Zestril] 10 mg PO DAILY #30 tab 06/21/16 [Rx]


Isosorbide Mononitrate ER [Imdur] 90 mg PO DAILY 02/02/21 [History]


metFORMIN HCL [Glucophage] 500 mg PO BID 02/02/21 [History]


Aspirin 81 mg PO DAILY #14 chewable 02/09/21 [Rx]


Aspirin 81 mg PO DAILY 30 Days #30 chew 02/09/21 [Rx]


Atorvastatin Calcium [Lipitor] 80 mg PO DAILY 14 Days #14 tablet 02/09/21 [Rx]


Atorvastatin [Lipitor] 80 mg PO DAILY 30 Days #30 tab 02/09/21 [Rx]


Cefuroxime Axetil [Ceftin] 500 mg PO BID 7 Days #14 tab 02/09/21 [Rx]


Metoprolol Tartrate [Lopressor] 25 mg PO BID 14 Days #28 tablet 02/09/21 [Rx]


Metoprolol Tartrate [Lopressor] 25 mg PO BID 30 Days #60 tab 02/09/21 [Rx]


Pantoprazole Sodium [Protonix] 40 mg PO DAILY #14 tablet. 02/09/21 [Rx]


Pantoprazole [Protonix] 40 mg PO AC-BRKFST 30 Days #30 tablet. 02/09/21 [Rx]


Sennosides [Senokot] 8.6 mg PO BID PRN #10 tablet 02/09/21 [Rx]








Follow up Appointment(s)/Referral(s): 


Nonstaff,Physician [Primary Care Provider] - 1-2 days


Patient Instructions/Handouts:  Heart Catheterization (DC)


Activity/Diet/Wound Care/Special Instructions: 


****NURSE****Indigent form is in chart and will need to be sent to [facility] pharmacy 

for 2 weeks of med coverage. [facility] will also [facility] for [facility] 

[facility] after [facility] weeks, there is a cover sheet in the chart to fax all Rx to 

[facility]. 





activity limited until follow-up


Patient will need to [facility] this [facility] to discuss 

hemoglobin A1c of 13.1 and possible need to initiate insulin and close glycemic 

control


Continue with consistent carb diabetic diet


Continue with antibiotics for 1 week


follow up infectious disease outpatient setting


Follow-up primary care provider





Two-week supply of prescriptions being sent to [facility]


[facility] scripts printed [facility] to [facility]





Discharge Disposition: HOME SELF-CARE